# Patient Record
Sex: FEMALE | Race: WHITE | NOT HISPANIC OR LATINO | Employment: OTHER | URBAN - NONMETROPOLITAN AREA
[De-identification: names, ages, dates, MRNs, and addresses within clinical notes are randomized per-mention and may not be internally consistent; named-entity substitution may affect disease eponyms.]

---

## 2018-09-13 ENCOUNTER — OFFICE VISIT (OUTPATIENT)
Dept: UROLOGY | Facility: CLINIC | Age: 48
End: 2018-09-13

## 2018-09-13 VITALS — WEIGHT: 195 LBS | HEIGHT: 65 IN | BODY MASS INDEX: 32.49 KG/M2

## 2018-09-13 DIAGNOSIS — N18.30 STAGE 3 CHRONIC KIDNEY DISEASE (HCC): ICD-10-CM

## 2018-09-13 DIAGNOSIS — N28.1 RENAL CYST: Primary | ICD-10-CM

## 2018-09-13 PROCEDURE — 99203 OFFICE O/P NEW LOW 30 MIN: CPT | Performed by: UROLOGY

## 2018-09-13 RX ORDER — VALACYCLOVIR HYDROCHLORIDE 500 MG/1
TABLET, FILM COATED ORAL DAILY
COMMUNITY
End: 2020-08-17

## 2018-09-13 RX ORDER — ACETAMINOPHEN,DIPHENHYDRAMINE HCL 500; 25 MG/1; MG/1
TABLET, FILM COATED ORAL
COMMUNITY

## 2018-09-13 RX ORDER — TIZANIDINE 4 MG/1
4 TABLET ORAL
COMMUNITY

## 2018-09-13 RX ORDER — LURASIDONE HYDROCHLORIDE 80 MG/1
TABLET, FILM COATED ORAL
COMMUNITY
End: 2019-09-19

## 2018-09-13 RX ORDER — ROSUVASTATIN CALCIUM 20 MG/1
TABLET, COATED ORAL
COMMUNITY

## 2018-09-13 RX ORDER — TOPIRAMATE 50 MG/1
TABLET, FILM COATED ORAL
COMMUNITY

## 2018-09-13 RX ORDER — BUPROPION HYDROCHLORIDE 150 MG/1
150 TABLET ORAL
COMMUNITY

## 2018-09-13 RX ORDER — MIRTAZAPINE 30 MG/1
30 TABLET, FILM COATED ORAL NIGHTLY
COMMUNITY

## 2018-09-13 RX ORDER — CETIRIZINE HYDROCHLORIDE 10 MG/1
TABLET ORAL
COMMUNITY

## 2018-09-13 RX ORDER — AMLODIPINE BESYLATE 10 MG/1
TABLET ORAL DAILY
COMMUNITY
Start: 2018-08-15

## 2018-09-13 NOTE — PROGRESS NOTES
Chief Complaint:          Complex cyst.    HPI:   47 y.o. female.  Pt's creat was 1.46. Pt had a small complex cyst on the left kidney that is 1.8 cm hypoechoic on ultrasound  HPI      Past Medical History:        Past Medical History:   Diagnosis Date   • Bipolar 1 disorder (CMS/HCC)    • Depression    • High cholesterol    • Hypertension          Current Meds:     Current Outpatient Prescriptions   Medication Sig Dispense Refill   • amLODIPine (NORVASC) 10 MG tablet Take  by mouth Daily.     • buPROPion XL (WELLBUTRIN XL) 300 MG 24 hr tablet Take  by mouth.     • Calcium Carb-Cholecalciferol (CALCIUM 600 + D PO) Take  by mouth.     • cetirizine (ZYRTEC ALLERGY) 10 MG tablet Take  by mouth.     • Cholecalciferol (VITAMIN D3) 5000 units capsule capsule Take  by mouth.     • diphenhydrAMINE-acetaminophen (TYLENOL PM EXTRA STRENGTH)  MG tablet per tablet Take  by mouth.     • Lurasidone HCl (LATUDA) 80 MG tablet Take  by mouth.     • mirtazapine (REMERON) 15 MG tablet Take  by mouth.     • Multiple Vitamins-Minerals (WOMENS ONE DAILY PO) Take  by mouth.     • rosuvastatin (CRESTOR) 20 MG tablet Take  by mouth.     • tiZANidine (ZANAFLEX) 4 MG tablet Take  by mouth.     • topiramate (TOPAMAX) 50 MG tablet Take  by mouth.     • valACYclovir (VALTREX) 500 MG tablet Take  by mouth Daily.       No current facility-administered medications for this visit.         Allergies:      No Known Allergies     Past Surgical History:     Past Surgical History:   Procedure Laterality Date   • CHOLECYSTECTOMY     • GASTRIC BYPASS     • HYSTERECTOMY     • TONSILLECTOMY           Social History:     Social History     Social History   • Marital status:      Spouse name: N/A   • Number of children: N/A   • Years of education: N/A     Occupational History   • Not on file.     Social History Main Topics   • Smoking status: Never Smoker   • Smokeless tobacco: Never Used   • Alcohol use No   • Drug use: No   • Sexual activity:  Not on file     Other Topics Concern   • Not on file     Social History Narrative   • No narrative on file       Family History:     Family History   Problem Relation Age of Onset   • Heart disease Father    • Hypertension Father    • Stroke Father    • Diabetes Father    • Hypertension Mother    • Hyperlipidemia Mother        Review of Systems:     Review of Systems   Constitutional: Positive for fatigue. Negative for chills and fever.   Respiratory: Negative for cough, shortness of breath and wheezing.    Cardiovascular: Negative for leg swelling.   Gastrointestinal: Positive for vomiting. Negative for abdominal pain and nausea.   Musculoskeletal: Positive for back pain. Negative for joint swelling.   Neurological: Positive for headaches. Negative for dizziness.   Psychiatric/Behavioral: Negative for confusion.       I have reviewed the follow portions of the patient's history and confirmed they are accurate today:  allergies, current medications, past family history, past medical history, past social history, past surgical history, problem list and ROS  Physical Exam:     Physical Exam   Constitutional: She is oriented to person, place, and time. She appears well-developed.   HENT:   Head: Normocephalic.   Right Ear: External ear normal.   Left Ear: External ear normal.   Nose: Nose normal.   Mouth/Throat: Oropharynx is clear and moist.   Eyes: Pupils are equal, round, and reactive to light. Conjunctivae and EOM are normal.   Neck: Normal range of motion. Neck supple. No tracheal deviation present. No thyromegaly present.   Cardiovascular: Normal heart sounds.  Exam reveals no gallop and no friction rub.    No murmur heard.  Pulmonary/Chest: Effort normal and breath sounds normal. No respiratory distress. She has no wheezes. She has no rales. She exhibits no tenderness.   Abdominal: Soft. Bowel sounds are normal. She exhibits no distension and no mass. There is no tenderness. There is no rebound and no guarding.  "No hernia.   Musculoskeletal: Normal range of motion. She exhibits no edema.   Lymphadenopathy:     She has no cervical adenopathy.   Neurological: She is alert and oriented to person, place, and time. She displays normal reflexes. No cranial nerve deficit. She exhibits normal muscle tone. Coordination normal.   Skin: Skin is warm. No rash noted.   Psychiatric: She has a normal mood and affect. Judgment normal.       Ht 165.1 cm (65\")   Wt 88.5 kg (195 lb)   BMI 32.45 kg/m²    Procedure:         Assessment:     Encounter Diagnoses   Name Primary?   • Renal cyst Yes   • Stage 3 chronic kidney disease      No orders of the defined types were placed in this encounter.      Plan:   Will get a stone study ct scan and follow the left renal lesion with both ct scans without contrast and renal ultrasounds periodically    Patient's Body mass index is 32.45 kg/m². BMI is within normal parameters. No follow-up required.      Counseling was given to patient and family for the following topics instructions for management as follows: renal lesion. The interim medical history and current results were reviewed.  A treatment plan with follow-up was made for Renal cyst [N28.1].This document has been electronically signed by Frandy Vergara MD September 13, 2018 4:13 PM  "

## 2018-10-02 ENCOUNTER — HOSPITAL ENCOUNTER (OUTPATIENT)
Dept: ULTRASOUND IMAGING | Facility: HOSPITAL | Age: 48
Discharge: HOME | End: 2018-10-02
Attending: UROLOGY | Admitting: UROLOGY

## 2018-10-02 DIAGNOSIS — N28.1 RENAL CYST: ICD-10-CM

## 2018-10-02 PROCEDURE — 76775 US EXAM ABDO BACK WALL LIM: CPT

## 2018-10-02 PROCEDURE — 76775 US EXAM ABDO BACK WALL LIM: CPT | Performed by: RADIOLOGY

## 2018-10-23 ENCOUNTER — HOSPITAL ENCOUNTER (OUTPATIENT)
Dept: CT IMAGING | Facility: HOSPITAL | Age: 48
Discharge: HOME | End: 2018-10-23
Attending: UROLOGY | Admitting: UROLOGY

## 2018-10-23 DIAGNOSIS — N28.1 RENAL CYST: ICD-10-CM

## 2018-10-23 PROCEDURE — 74176 CT ABD & PELVIS W/O CONTRAST: CPT

## 2018-10-23 PROCEDURE — 74176 CT ABD & PELVIS W/O CONTRAST: CPT | Performed by: RADIOLOGY

## 2019-03-14 ENCOUNTER — OFFICE VISIT (OUTPATIENT)
Dept: UROLOGY | Facility: CLINIC | Age: 49
End: 2019-03-14

## 2019-03-14 VITALS — WEIGHT: 195 LBS | HEIGHT: 65 IN | BODY MASS INDEX: 32.49 KG/M2

## 2019-03-14 DIAGNOSIS — N28.1 RENAL CYST: Primary | ICD-10-CM

## 2019-03-14 PROCEDURE — 99212 OFFICE O/P EST SF 10 MIN: CPT | Performed by: UROLOGY

## 2019-03-14 NOTE — PROGRESS NOTES
Chief Complaint:          Hx of renal cyst.    HPI:   48 y.o. female.  The ct scan done without contrast did not show any complex cysts.  Patient had a renal ultrasound that suggested a complex cyst 6 months earlier  HPI      Past Medical History:        Past Medical History:   Diagnosis Date   • Bipolar 1 disorder (CMS/HCC)    • Depression    • High cholesterol    • Hypertension          Current Meds:     Current Outpatient Medications   Medication Sig Dispense Refill   • amLODIPine (NORVASC) 10 MG tablet Take  by mouth Daily.     • buPROPion XL (WELLBUTRIN XL) 300 MG 24 hr tablet Take  by mouth.     • Calcium Carb-Cholecalciferol (CALCIUM 600 + D PO) Take  by mouth.     • cetirizine (ZYRTEC ALLERGY) 10 MG tablet Take  by mouth.     • Cholecalciferol (VITAMIN D3) 5000 units capsule capsule Take  by mouth.     • diphenhydrAMINE-acetaminophen (TYLENOL PM EXTRA STRENGTH)  MG tablet per tablet Take  by mouth.     • Lurasidone HCl (LATUDA) 80 MG tablet Take  by mouth.     • mirtazapine (REMERON) 15 MG tablet Take  by mouth.     • Multiple Vitamins-Minerals (WOMENS ONE DAILY PO) Take  by mouth.     • rosuvastatin (CRESTOR) 20 MG tablet Take  by mouth.     • tiZANidine (ZANAFLEX) 4 MG tablet Take  by mouth.     • topiramate (TOPAMAX) 50 MG tablet Take  by mouth.     • valACYclovir (VALTREX) 500 MG tablet Take  by mouth Daily.       No current facility-administered medications for this visit.         Allergies:      No Known Allergies     Past Surgical History:     Past Surgical History:   Procedure Laterality Date   • CHOLECYSTECTOMY     • GASTRIC BYPASS     • HYSTERECTOMY     • TONSILLECTOMY           Social History:     Social History     Socioeconomic History   • Marital status:      Spouse name: Not on file   • Number of children: Not on file   • Years of education: Not on file   • Highest education level: Not on file   Social Needs   • Financial resource strain: Not on file   • Food insecurity - worry:  Not on file   • Food insecurity - inability: Not on file   • Transportation needs - medical: Not on file   • Transportation needs - non-medical: Not on file   Occupational History   • Not on file   Tobacco Use   • Smoking status: Never Smoker   • Smokeless tobacco: Never Used   Substance and Sexual Activity   • Alcohol use: No   • Drug use: No   • Sexual activity: Not on file   Other Topics Concern   • Not on file   Social History Narrative   • Not on file       Family History:     Family History   Problem Relation Age of Onset   • Heart disease Father    • Hypertension Father    • Stroke Father    • Diabetes Father    • Hypertension Mother    • Hyperlipidemia Mother        Review of Systems:     Review of Systems      Physical Exam:     Physical Exam   Constitutional: She is oriented to person, place, and time. She appears well-developed.   HENT:   Head: Normocephalic.   Right Ear: External ear normal.   Left Ear: External ear normal.   Nose: Nose normal.   Mouth/Throat: Oropharynx is clear and moist.   Eyes: Conjunctivae and EOM are normal. Pupils are equal, round, and reactive to light.   Neck: Normal range of motion. Neck supple. No tracheal deviation present. No thyromegaly present.   Cardiovascular: Normal heart sounds. Exam reveals no gallop and no friction rub.   No murmur heard.  Pulmonary/Chest: Effort normal and breath sounds normal. No respiratory distress. She has no wheezes. She has no rales. She exhibits no tenderness.   Abdominal: Soft. Bowel sounds are normal. She exhibits no distension and no mass. There is no tenderness. There is no rebound and no guarding. No hernia.   Musculoskeletal: Normal range of motion. She exhibits no edema.   Lymphadenopathy:     She has no cervical adenopathy.   Neurological: She is alert and oriented to person, place, and time. She displays normal reflexes. No cranial nerve deficit. She exhibits normal muscle tone. Coordination normal.   Skin: Skin is warm. No rash  "noted.   Psychiatric: She has a normal mood and affect. Judgment normal.       Ht 165.1 cm (65\")   Wt 88.5 kg (195 lb)   BMI 32.45 kg/m²    Procedure:         Assessment:     Encounter Diagnosis   Name Primary?   • Renal cyst Yes     No orders of the defined types were placed in this encounter.      Plan:     Renal cysts are important to identify because some cysts have a high association with kidney cancer.  There is a classification system of renal cysts known as Bosniak classification.1 renal cyst is simple like a water balloon thin-walled and has no internal echoes.  Type I Bosniak cyst have been negligible risk of kidney cancer and require reassurance only.  Bosniak 2 cysts have a septated cyst or thickened wall or hyper-density on a CT scan.  These have a low risk of malignancy but more than Bosniak type I and do require follow-up with imaging periodically.  Bosniak 3 cyst have solid and cystic components calcification not confined to the wall of the cyst.  A Bosniak 3 has a 28% chance of renal cancer.  Bosniak 4 has more solid than cystic component and is by definition a kidney cancer.  Large simple renal cysts are occasionally a source of back pain but this requires improving that the cyst is the etiology of the pain by aspiration of the cyst and alleviating the pain and then when the cyst has filled up again the pain is returned.  This does happen but often a cyst t is large and asymptomatic.  The patient likely has a Bosniak 2 complex renal cyst that is small.  We will repeat a renal ultrasound in 6 months to see if we can re-create the imaging findings.  Where is the noncontrast CT scan did not show it  Patient's Body mass index is 32.45 kg/m². BMI is above normal parameters. Recommendations include: educational material.      Counseling was given to patient and family for the following topics diagnostic results including: ct scan findings. The interim medical history and current results were reviewed.  " A treatment plan with follow-up was made for Renal cyst [N28.1].  This document has been electronically signed by Frandy Vergara MD March 14, 2019 2:56 PM

## 2019-04-24 ENCOUNTER — TRANSCRIBE ORDERS (OUTPATIENT)
Dept: ADMINISTRATIVE | Facility: HOSPITAL | Age: 49
End: 2019-04-24

## 2019-04-24 DIAGNOSIS — Z12.31 ENCOUNTER FOR SCREENING MAMMOGRAM FOR MALIGNANT NEOPLASM OF BREAST: Primary | ICD-10-CM

## 2019-06-10 ENCOUNTER — HOSPITAL ENCOUNTER (OUTPATIENT)
Dept: MAMMOGRAPHY | Facility: HOSPITAL | Age: 49
Discharge: HOME | End: 2019-06-10
Admitting: NURSE PRACTITIONER

## 2019-06-10 DIAGNOSIS — Z12.31 ENCOUNTER FOR SCREENING MAMMOGRAM FOR MALIGNANT NEOPLASM OF BREAST: ICD-10-CM

## 2019-06-10 PROCEDURE — 77063 BREAST TOMOSYNTHESIS BI: CPT

## 2019-06-10 PROCEDURE — 77067 SCR MAMMO BI INCL CAD: CPT

## 2019-08-23 ENCOUNTER — HOSPITAL ENCOUNTER (OUTPATIENT)
Dept: ULTRASOUND IMAGING | Facility: HOSPITAL | Age: 49
Discharge: HOME | End: 2019-08-23
Admitting: UROLOGY

## 2019-08-23 DIAGNOSIS — N28.1 RENAL CYST: ICD-10-CM

## 2019-08-23 PROCEDURE — 76775 US EXAM ABDO BACK WALL LIM: CPT

## 2019-08-23 PROCEDURE — 76775 US EXAM ABDO BACK WALL LIM: CPT | Performed by: RADIOLOGY

## 2019-09-19 ENCOUNTER — OFFICE VISIT (OUTPATIENT)
Dept: UROLOGY | Facility: CLINIC | Age: 49
End: 2019-09-19

## 2019-09-19 VITALS — BODY MASS INDEX: 34.66 KG/M2 | WEIGHT: 208 LBS | HEIGHT: 65 IN

## 2019-09-19 DIAGNOSIS — R35.0 FREQUENCY OF MICTURITION: Primary | ICD-10-CM

## 2019-09-19 DIAGNOSIS — N39.0 URINARY TRACT INFECTION WITHOUT HEMATURIA, SITE UNSPECIFIED: ICD-10-CM

## 2019-09-19 LAB
BILIRUB BLD-MCNC: NEGATIVE MG/DL
CLARITY, POC: CLEAR
COLOR UR: YELLOW
GLUCOSE UR STRIP-MCNC: NEGATIVE MG/DL
KETONES UR QL: NEGATIVE
LEUKOCYTE EST, POC: ABNORMAL
NITRITE UR-MCNC: NEGATIVE MG/ML
PH UR: 6 [PH] (ref 5–8)
PROT UR STRIP-MCNC: NEGATIVE MG/DL
RBC # UR STRIP: NEGATIVE /UL
SP GR UR: 1.02 (ref 1–1.03)
UROBILINOGEN UR QL: NORMAL

## 2019-09-19 PROCEDURE — 99213 OFFICE O/P EST LOW 20 MIN: CPT | Performed by: UROLOGY

## 2019-09-19 PROCEDURE — 87077 CULTURE AEROBIC IDENTIFY: CPT | Performed by: UROLOGY

## 2019-09-19 PROCEDURE — 81003 URINALYSIS AUTO W/O SCOPE: CPT | Performed by: UROLOGY

## 2019-09-19 PROCEDURE — 87086 URINE CULTURE/COLONY COUNT: CPT | Performed by: UROLOGY

## 2019-09-19 PROCEDURE — 87186 SC STD MICRODIL/AGAR DIL: CPT | Performed by: UROLOGY

## 2019-09-19 RX ORDER — NITROFURANTOIN 25; 75 MG/1; MG/1
100 CAPSULE ORAL 2 TIMES DAILY
Qty: 14 CAPSULE | Refills: 0 | Status: SHIPPED | OUTPATIENT
Start: 2019-09-19 | End: 2020-08-17

## 2019-09-19 NOTE — PROGRESS NOTES
Chief Complaint:          History of Renal Cyst    HPI:   48 y.o. female. Pt was referred for a complex cyst by her nephrologist that was found by a renal ultrasound.  She had a ct scan with and without contrast and no cyst or mass was seen.  She is here for evaluation of her renal ultrasound to see if the cyst is seen on the same imaging modality as it was initially seen.  Her renal ultrasound is normal    Upon discussing her urinary symptoms it sound like she has a new bladder infection.  Patient also complaints or urinary Frequency and dysuria. Her urinalysis shows some leukocytes. She denies seeing any blood.  She denies fever or chills.      HPI      Past Medical History:        Past Medical History:   Diagnosis Date   • Bipolar 1 disorder (CMS/HCC)    • Depression    • High cholesterol    • Hypertension          Current Meds:     Current Outpatient Medications   Medication Sig Dispense Refill   • amLODIPine (NORVASC) 10 MG tablet Take  by mouth Daily.     • buPROPion XL (WELLBUTRIN XL) 300 MG 24 hr tablet Take 150 mg by mouth.     • Calcium Carb-Cholecalciferol (CALCIUM 600 + D PO) Take  by mouth.     • cetirizine (ZYRTEC ALLERGY) 10 MG tablet Take  by mouth.     • Cholecalciferol (VITAMIN D3) 5000 units capsule capsule Take  by mouth.     • diphenhydrAMINE-acetaminophen (TYLENOL PM EXTRA STRENGTH)  MG tablet per tablet Take  by mouth.     • mirtazapine (REMERON) 15 MG tablet Take  by mouth.     • Multiple Vitamins-Minerals (WOMENS ONE DAILY PO) Take  by mouth.     • rosuvastatin (CRESTOR) 20 MG tablet Take  by mouth.     • tiZANidine (ZANAFLEX) 4 MG tablet Take  by mouth.     • topiramate (TOPAMAX) 50 MG tablet Take  by mouth.     • valACYclovir (VALTREX) 500 MG tablet Take  by mouth Daily.       No current facility-administered medications for this visit.         Allergies:      No Known Allergies     Past Surgical History:     Past Surgical History:   Procedure Laterality Date   • CHOLECYSTECTOMY      • GASTRIC BYPASS     • HYSTERECTOMY     • TONSILLECTOMY           Social History:     Social History     Socioeconomic History   • Marital status:      Spouse name: Not on file   • Number of children: Not on file   • Years of education: Not on file   • Highest education level: Not on file   Tobacco Use   • Smoking status: Never Smoker   • Smokeless tobacco: Never Used   Substance and Sexual Activity   • Alcohol use: No   • Drug use: No       Family History:     Family History   Problem Relation Age of Onset   • Heart disease Father    • Hypertension Father    • Stroke Father    • Diabetes Father    • Hypertension Mother    • Hyperlipidemia Mother        Review of Systems:     Review of Systems   Constitutional: Negative for fatigue.   HENT: Positive for sinus pressure and sinus pain.    Eyes: Negative for pain and redness.   Respiratory: Negative for chest tightness.    Cardiovascular: Negative for chest pain.   Gastrointestinal: Negative for abdominal pain, constipation and diarrhea.   Endocrine: Negative for heat intolerance.   Genitourinary: Positive for frequency. Negative for dysuria and hematuria.   Musculoskeletal: Positive for back pain.   Skin: Negative for rash.   Allergic/Immunologic: Positive for food allergies.   Neurological: Positive for headaches.   Hematological: Does not bruise/bleed easily.   Psychiatric/Behavioral: The patient is nervous/anxious.                Physical Exam:     Physical Exam   Constitutional: She is oriented to person, place, and time. She appears well-developed.   HENT:   Head: Normocephalic.   Right Ear: External ear normal.   Left Ear: External ear normal.   Nose: Nose normal.   Mouth/Throat: Oropharynx is clear and moist.   Eyes: Conjunctivae and EOM are normal. Pupils are equal, round, and reactive to light.   Neck: Normal range of motion. Neck supple. No tracheal deviation present. No thyromegaly present.   Cardiovascular: Normal heart sounds. Exam reveals no  "gallop and no friction rub.   No murmur heard.  Pulmonary/Chest: Effort normal and breath sounds normal. No respiratory distress. She has no wheezes. She has no rales. She exhibits no tenderness.   Abdominal: Soft. Bowel sounds are normal. She exhibits no distension and no mass. There is no tenderness. There is no rebound and no guarding. No hernia.   Musculoskeletal: Normal range of motion. She exhibits no edema.   Lymphadenopathy:     She has no cervical adenopathy.   Neurological: She is alert and oriented to person, place, and time. She displays normal reflexes. No cranial nerve deficit. She exhibits normal muscle tone. Coordination normal.   Skin: Skin is warm. No rash noted.   Psychiatric: She has a normal mood and affect. Judgment normal.       Ht 165.1 cm (65\")   Wt 94.3 kg (208 lb)   BMI 34.61 kg/m²    Procedure:         Assessment:     Encounter Diagnoses   Name Primary?   • Frequency of micturition Yes   • Urinary tract infection without hematuria, site unspecified        Orders Placed This Encounter   Procedures   • Urine Culture - Urine, Urine, Random Void   • POC Urinalysis Dipstick, Automated       Patient reports that she is not currently experiencing any symptoms of urinary incontinence.      Plan:     Will do a urine culture. Will start patient on Macrobid 100 mg BID x 7 days. Will see her back in three months unless her symptoms do not resolve for which she has been educated to call for an earlier appointment.    Patient's Body mass index is 34.61 kg/m². BMI is above normal parameters. Recommendations include: educational material.      Smoking Cessation Counseling:  Never a smoker.  Patient does not currently use any tobacco products.       Counseling was given to patient for the following topics prognosis and treatment as follows: antibiotics, increasing fluid intake and hygiene, daiabetic management. The interim medical history and current results were reviewed.  A treatment plan with " follow-up was made for Frequency of micturition [R35.0].         This document has been electronically signed by Frandy Vergara MD September 19, 2019 1:02 PM

## 2019-09-21 LAB — BACTERIA SPEC AEROBE CULT: ABNORMAL

## 2019-09-23 ENCOUNTER — TELEPHONE (OUTPATIENT)
Dept: UROLOGY | Facility: CLINIC | Age: 49
End: 2019-09-23

## 2019-09-23 DIAGNOSIS — N39.0 URINARY TRACT INFECTION WITHOUT HEMATURIA, SITE UNSPECIFIED: Primary | ICD-10-CM

## 2019-09-23 RX ORDER — SULFAMETHOXAZOLE AND TRIMETHOPRIM 800; 160 MG/1; MG/1
1 TABLET ORAL 2 TIMES DAILY
Qty: 20 TABLET | Refills: 0 | Status: SHIPPED | OUTPATIENT
Start: 2019-09-23 | End: 2020-08-17

## 2019-09-23 NOTE — TELEPHONE ENCOUNTER
----- Message from Frandy Vergara MD sent at 9/23/2019  9:25 AM EDT -----  Call pt and tell her the organism is resistant to macrodantin.  Tell her the name of the organism.  If she is not allergic to sulfa have her take septra DS bid dist 20 and if she is allergic then cipro 250 mg bid dist 20  ----- Message -----  From: Lenny Avilez MA  Sent: 9/19/2019   1:02 PM  To: Frandy Vergara MD

## 2020-08-17 ENCOUNTER — OFFICE VISIT (OUTPATIENT)
Dept: UROLOGY | Facility: CLINIC | Age: 50
End: 2020-08-17

## 2020-08-17 VITALS — WEIGHT: 211 LBS | BODY MASS INDEX: 35.16 KG/M2 | HEIGHT: 65 IN | TEMPERATURE: 98.6 F

## 2020-08-17 DIAGNOSIS — N28.1 RENAL CYST: Primary | ICD-10-CM

## 2020-08-17 PROCEDURE — 99213 OFFICE O/P EST LOW 20 MIN: CPT | Performed by: UROLOGY

## 2020-08-17 RX ORDER — LURASIDONE HYDROCHLORIDE 80 MG/1
1 TABLET, FILM COATED ORAL DAILY
COMMUNITY

## 2020-08-17 RX ORDER — CLONIDINE HYDROCHLORIDE 0.1 MG/1
0.1 TABLET ORAL 2 TIMES DAILY
COMMUNITY

## 2020-08-17 RX ORDER — ASPIRIN 81 MG/1
81 TABLET ORAL DAILY
COMMUNITY

## 2020-08-17 NOTE — PROGRESS NOTES
Chief Complaint:          Chief Complaint   Patient presents with   • Renal Cyst     1 yr f/u       HPI:   49 y.o. female is referred for second opinion apparently saw Dr. Vergara in 2018, 2019 1 was a CT scan one was an ultrasound neither showed a cyst apparently she is been diagnosed with a small 1.8 cm cyst and she previously weighed 350 pounds she now weighs 211 after very successful sling.  I reviewed the films.  I did not have an x-ray that in the system demonstrating a cyst I had a note from [facility] with a disc with no drivers therefore was unable to be reviewed showing a small left kidney.  With normal echogenicity and a very small perhaps Bosniak 2 cyst her urinalysis was 2+ positive for leukocyte esterase her creatinine is slightly high at 1.15 indicating stage II chronic kidney disease I discussed the diagnosis of cystic disease at length I will go ahead and order a MRI as confirmation      Past Medical History:        Past Medical History:   Diagnosis Date   • Bipolar 1 disorder (CMS/HCC)    • Depression    • High cholesterol    • Hypertension          Current Meds:     Current Outpatient Medications   Medication Sig Dispense Refill   • amLODIPine (NORVASC) 10 MG tablet Take  by mouth Daily.     • aspirin 81 MG EC tablet Take 81 mg by mouth Daily.     • buPROPion XL (WELLBUTRIN XL) 150 MG 24 hr tablet Take 150 mg by mouth.     • CALCIUM PO Take  by mouth 3 (Three) Times a Day.     • cetirizine (ZYRTEC ALLERGY) 10 MG tablet Take  by mouth.     • cloNIDine (CATAPRES) 0.1 MG tablet Take 0.1 mg by mouth 2 (Two) Times a Day.     • diphenhydrAMINE-acetaminophen (TYLENOL PM EXTRA STRENGTH)  MG tablet per tablet Take  by mouth.     • Lurasidone HCl (Latuda) 80 MG tablet Take 1 tablet by mouth Daily.     • mirtazapine (REMERON) 30 MG tablet Take 30 mg by mouth Every Night.     • Multiple Vitamins-Minerals (WOMENS ONE DAILY PO) Take  by mouth.     • rosuvastatin (CRESTOR) 20 MG tablet Take  by  mouth.     • tiZANidine (ZANAFLEX) 4 MG tablet Take 4 mg by mouth.     • topiramate (TOPAMAX) 50 MG tablet Take  by mouth.       No current facility-administered medications for this visit.         Allergies:      Allergies   Allergen Reactions   • Egg Yolk Nausea Only        Past Surgical History:     Past Surgical History:   Procedure Laterality Date   • CHOLECYSTECTOMY     • GASTRIC BYPASS     • HYSTERECTOMY     • TONSILLECTOMY           Social History:     Social History     Socioeconomic History   • Marital status:      Spouse name: Not on file   • Number of children: Not on file   • Years of education: Not on file   • Highest education level: Not on file   Tobacco Use   • Smoking status: Never Smoker   • Smokeless tobacco: Never Used   Substance and Sexual Activity   • Alcohol use: No   • Drug use: No       Family History:     Family History   Problem Relation Age of Onset   • Heart disease Father    • Hypertension Father    • Stroke Father    • Diabetes Father    • Hypertension Mother    • Hyperlipidemia Mother        Review of Systems:     Review of Systems   Constitutional: Negative.  Negative for activity change, appetite change, chills, diaphoresis, fatigue and unexpected weight change.   HENT: Negative for congestion, dental problem, drooling, ear discharge, ear pain, facial swelling, hearing loss, mouth sores, nosebleeds, postnasal drip, rhinorrhea, sinus pressure, sneezing, sore throat, tinnitus, trouble swallowing and voice change.    Eyes: Negative.  Negative for photophobia, pain, discharge, redness, itching and visual disturbance.   Respiratory: Negative.  Negative for apnea, cough, choking, chest tightness, shortness of breath, wheezing and stridor.    Cardiovascular: Negative.  Negative for chest pain, palpitations and leg swelling.   Gastrointestinal: Negative.  Negative for abdominal distention, abdominal pain, anal bleeding, blood in stool, constipation, diarrhea, nausea, rectal pain  and vomiting.   Endocrine: Negative.  Negative for cold intolerance, heat intolerance, polydipsia, polyphagia and polyuria.   Musculoskeletal: Negative.  Negative for arthralgias, back pain, gait problem, joint swelling, myalgias, neck pain and neck stiffness.   Skin: Negative.  Negative for color change, pallor, rash and wound.   Allergic/Immunologic: Negative.  Negative for environmental allergies, food allergies and immunocompromised state.   Neurological: Negative.  Negative for dizziness, tremors, seizures, syncope, facial asymmetry, speech difficulty, weakness, light-headedness, numbness and headaches.   Hematological: Negative.  Negative for adenopathy. Does not bruise/bleed easily.   Psychiatric/Behavioral: Negative for agitation, behavioral problems, confusion, decreased concentration, dysphoric mood, hallucinations, self-injury, sleep disturbance and suicidal ideas. The patient is not nervous/anxious and is not hyperactive.    All other systems reviewed and are negative.      Physical Exam:     Physical Exam   Constitutional: She appears well-developed and well-nourished.   HENT:   Head: Normocephalic and atraumatic.   Right Ear: External ear normal.   Left Ear: External ear normal.   Mouth/Throat: Oropharynx is clear and moist.   Eyes: Pupils are equal, round, and reactive to light. Conjunctivae are normal.   Cardiovascular: Normal rate, regular rhythm, normal heart sounds and intact distal pulses.   Pulmonary/Chest: Effort normal and breath sounds normal.   Abdominal: Soft. Bowel sounds are normal. She exhibits no distension and no mass. There is no tenderness. There is no rebound and no guarding.   Genitourinary: No vaginal discharge found.   Musculoskeletal: Normal range of motion.   Neurological: She is alert. She has normal reflexes.   Skin: Skin is warm and dry.   Psychiatric: She has a normal mood and affect. Her behavior is normal. Judgment and thought content normal.       I have reviewed the  following portions of the patient's history: allergies, current medications, past family history, past medical history, past social history, past surgical history, problem list and ROS and confirm it's accurate.      Procedure:       Assessment/Plan:   Renal cyst-I discussed the Bosniak classification of renal cysts.  I discussed the strict criteria involved with making a decision regarding intervention.  We discussed the fact that this is likely a Bosniak type I cyst which has sharp distinct borders and a thin wall and no internal echoes versus a Bosniak 2 with a thicker wall and faint striations.  We discussed the risks of malignancy in these situations is essentially 0 and requires no further intervention however we discussed the fact that a Bosniak 3 has about a 28% chance of malignancy and finally a Bosniak for probably is representative of a renal cell carcinoma variant.  He discussed the fact that these are generally asymptomatic and do not require intervention and that the appropriate surgical management is a radiographic cyst puncture when causing pain or problems particularly on the left with an early satiety get MRI scan            Patient's Body mass index is 35.11 kg/m². BMI is above normal parameters. Recommendations include: educational material.              This document has been electronically signed by SALOMON CARBALLO MD August 17, 2020 14:14

## 2020-08-18 PROBLEM — N28.1 RENAL CYST: Status: ACTIVE | Noted: 2020-08-18

## 2020-08-24 ENCOUNTER — HOSPITAL ENCOUNTER (OUTPATIENT)
Dept: MRI IMAGING | Facility: HOSPITAL | Age: 50
Discharge: HOME | End: 2020-08-24
Admitting: UROLOGY

## 2020-08-24 DIAGNOSIS — N28.1 RENAL CYST: ICD-10-CM

## 2020-08-24 PROCEDURE — 74181 MRI ABDOMEN W/O CONTRAST: CPT | Performed by: RADIOLOGY

## 2020-08-24 PROCEDURE — 74181 MRI ABDOMEN W/O CONTRAST: CPT

## 2020-09-01 ENCOUNTER — OFFICE VISIT (OUTPATIENT)
Dept: UROLOGY | Facility: CLINIC | Age: 50
End: 2020-09-01

## 2020-09-01 VITALS — TEMPERATURE: 98.5 F | WEIGHT: 210.98 LBS | BODY MASS INDEX: 35.15 KG/M2 | HEIGHT: 65 IN

## 2020-09-01 DIAGNOSIS — N30.00 ACUTE CYSTITIS WITHOUT HEMATURIA: Primary | ICD-10-CM

## 2020-09-01 DIAGNOSIS — N32.81 DETRUSOR INSTABILITY: ICD-10-CM

## 2020-09-01 DIAGNOSIS — N28.1 RENAL CYST: ICD-10-CM

## 2020-09-01 PROCEDURE — 99214 OFFICE O/P EST MOD 30 MIN: CPT | Performed by: UROLOGY

## 2020-09-01 RX ORDER — CIPROFLOXACIN 500 MG/1
250 TABLET, FILM COATED ORAL 2 TIMES DAILY
Qty: 20 TABLET | Refills: 0 | Status: SHIPPED | OUTPATIENT
Start: 2020-09-01

## 2020-09-01 NOTE — PROGRESS NOTES
Chief Complaint:          Chief Complaint   Patient presents with   • RENAL CYST     2 WEEK F/U       HPI:   49 y.o. female was referred for second opinion apparently saw Dr. Vergara in 2018, 2019 1 was a CT scan one was an ultrasound neither showed a cyst apparently she is been diagnosed with a small 1.8 cm cyst and she previously weighed 350 pounds she now weighs 211 after very successful sling.  I reviewed the films.  I did not have an x-ray that in the system demonstrating a cyst I had a note from [facility] with a disc with no drivers therefore was unable to be reviewed showing a small left kidney.  With normal echogenicity and a very small perhaps Bosniak 2 cyst her urinalysis was 2+ positive for leukocyte esterase her creatinine is slightly high at 1.15 indicating stage II chronic kidney disease I discussed the diagnosis of cystic disease at length I will go ahead and order a MRI as confirmation.  She returns today I reviewed the MRI with her from done on August 20 4000 2020 showing simple cyst this is Bosniak 1 simple renal cystic disease of no clinical significance and therefore I recommend only observation she has significant dysuria she still gets up 4-5 times per night.  She I am to start her on prophylactic Macrobid.  For recurrent urinary tract infections we discussed probiotics I will see her back in 6 months      Past Medical History:        Past Medical History:   Diagnosis Date   • Bipolar 1 disorder (CMS/HCC)    • Depression    • High cholesterol    • Hypertension          Current Meds:     Current Outpatient Medications   Medication Sig Dispense Refill   • amLODIPine (NORVASC) 10 MG tablet Take  by mouth Daily.     • aspirin 81 MG EC tablet Take 81 mg by mouth Daily.     • buPROPion XL (WELLBUTRIN XL) 150 MG 24 hr tablet Take 150 mg by mouth.     • CALCIUM PO Take  by mouth 3 (Three) Times a Day.     • cetirizine (ZYRTEC ALLERGY) 10 MG tablet Take  by mouth.     • cloNIDine (CATAPRES) 0.1  MG tablet Take 0.1 mg by mouth 2 (Two) Times a Day.     • diphenhydrAMINE-acetaminophen (TYLENOL PM EXTRA STRENGTH)  MG tablet per tablet Take  by mouth.     • Lurasidone HCl (Latuda) 80 MG tablet Take 1 tablet by mouth Daily.     • mirtazapine (REMERON) 30 MG tablet Take 30 mg by mouth Every Night.     • Multiple Vitamins-Minerals (WOMENS ONE DAILY PO) Take  by mouth.     • rosuvastatin (CRESTOR) 20 MG tablet Take  by mouth.     • tiZANidine (ZANAFLEX) 4 MG tablet Take 4 mg by mouth.     • topiramate (TOPAMAX) 50 MG tablet Take  by mouth.       No current facility-administered medications for this visit.         Allergies:      Allergies   Allergen Reactions   • Egg Yolk Nausea Only        Past Surgical History:     Past Surgical History:   Procedure Laterality Date   • CHOLECYSTECTOMY     • GASTRIC BYPASS     • HYSTERECTOMY     • TONSILLECTOMY           Social History:     Social History     Socioeconomic History   • Marital status:      Spouse name: Not on file   • Number of children: Not on file   • Years of education: Not on file   • Highest education level: Not on file   Tobacco Use   • Smoking status: Never Smoker   • Smokeless tobacco: Never Used   Substance and Sexual Activity   • Alcohol use: No   • Drug use: No       Family History:     Family History   Problem Relation Age of Onset   • Heart disease Father    • Hypertension Father    • Stroke Father    • Diabetes Father    • Hypertension Mother    • Hyperlipidemia Mother        Review of Systems:     Review of Systems   Constitutional: Negative.  Negative for activity change, appetite change, chills, diaphoresis, fatigue and unexpected weight change.   HENT: Negative for congestion, dental problem, drooling, ear discharge, ear pain, facial swelling, hearing loss, mouth sores, nosebleeds, postnasal drip, rhinorrhea, sinus pressure, sneezing, sore throat, tinnitus, trouble swallowing and voice change.    Eyes: Negative.  Negative for  photophobia, pain, discharge, redness, itching and visual disturbance.   Respiratory: Negative.  Negative for apnea, cough, choking, chest tightness, shortness of breath, wheezing and stridor.    Cardiovascular: Negative.  Negative for chest pain, palpitations and leg swelling.   Gastrointestinal: Negative.  Negative for abdominal distention, abdominal pain, anal bleeding, blood in stool, constipation, diarrhea, nausea, rectal pain and vomiting.   Endocrine: Negative.  Negative for cold intolerance, heat intolerance, polydipsia, polyphagia and polyuria.   Genitourinary: Positive for frequency and urgency.   Musculoskeletal: Negative.  Negative for arthralgias, back pain, gait problem, joint swelling, myalgias, neck pain and neck stiffness.   Skin: Negative.  Negative for color change, pallor, rash and wound.   Allergic/Immunologic: Negative.  Negative for environmental allergies, food allergies and immunocompromised state.   Neurological: Negative.  Negative for dizziness, tremors, seizures, syncope, facial asymmetry, speech difficulty, weakness, light-headedness, numbness and headaches.   Hematological: Negative.  Negative for adenopathy. Does not bruise/bleed easily.   Psychiatric/Behavioral: Negative for agitation, behavioral problems, confusion, decreased concentration, dysphoric mood, hallucinations, self-injury, sleep disturbance and suicidal ideas. The patient is not nervous/anxious and is not hyperactive.    All other systems reviewed and are negative.      Physical Exam:     Physical Exam   Constitutional: She appears well-developed and well-nourished.   HENT:   Head: Normocephalic and atraumatic.   Right Ear: External ear normal.   Left Ear: External ear normal.   Mouth/Throat: Oropharynx is clear and moist.   Eyes: Pupils are equal, round, and reactive to light. Conjunctivae are normal.   Cardiovascular: Normal rate, regular rhythm, normal heart sounds and intact distal pulses.   Pulmonary/Chest: Effort  normal and breath sounds normal.   Abdominal: Soft. Bowel sounds are normal. She exhibits no distension and no mass. There is no tenderness. There is no rebound and no guarding.   Genitourinary: No vaginal discharge found.   Musculoskeletal: Normal range of motion.   Neurological: She is alert. She has normal reflexes.   Skin: Skin is warm and dry.   Psychiatric: She has a normal mood and affect. Her behavior is normal. Judgment and thought content normal.       I have reviewed the following portions of the patient's history: allergies, current medications, past family history, past medical history, past social history, past surgical history, problem list and ROS and confirm it's accurate.      Procedure:       Assessment/Plan:   Renal cyst-I discussed the Bosniak classification of renal cysts.  I discussed the strict criteria involved with making a decision regarding intervention.  We discussed the fact that this is likely a Bosniak type I cyst which has sharp distinct borders and a thin wall and no internal echoes versus a Bosniak 2 with a thicker wall and faint striations.  We discussed the risks of malignancy in these situations is essentially 0 and requires no further intervention however we discussed the fact that a Bosniak 3 has about a 28% chance of malignancy and finally a Bosniak for probably is representative of a renal cell carcinoma variant.  He discussed the fact that these are generally asymptomatic and do not require intervention and that the appropriate surgical management is a radiographic cyst puncture when causing pain or problems particularly on the left with an early satiety.  Renal MRI was confirmatory for benign simple cystic disease Bosniak 1.  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  The mainstay of treatment has been tight cholinergics which basically caused the bladder to have decreased contractility.  We have discussed the side effects of these treatments including dry mouth, double vision, and increasing constipation.  Recurrent urinary tract infections-patient has been referred and diagnosed with recurrent urinary tract infections.  We discussed the types of organisms that are found in the urinary tract indicating that the vast majority are results of the patient's own gastrointestinal felicitas.  We discussed how many of the antibiotics that are utilized can actually exacerbate these infections by creating resistant organisms and there is only a very few antibiotics that are concentrated in the urine and do not affect the rectal reservoir nor cause recurrent yeast vaginitis.  We discussed the risk factors for recurrent infections being intercourse in younger patients and atrophic changes in older patients.  We discussed the symptoms that are found including pain, pressure, burning, frequency, urgency suprapubic pain and painful intercourse.  I discussed upper tract symptoms including fevers, chills, and indicated the workup would be much more aggressive if the patient were to present with recurrent infections in the face of upper tract symptomatology such as fever.  I discussed the history of vesicoureteral reflux in young patients and finally chronic renal scarring as a result of such.  I recommend concomitant probiotics with treatment with antibiotics to protect the rectal reservoir including over-the-counter yogurt preparations to annmarie oral pills containing the appropriate probiotics.  Start her on prophylactic Macrobid          Patient's Body mass index is 35.11 kg/m². BMI is above normal parameters. Recommendations include: educational material.              This document has been electronically signed by SALOMON CARBALLO MD September 1, 2020 09:05

## 2020-09-02 PROBLEM — N32.81 DETRUSOR INSTABILITY: Status: ACTIVE | Noted: 2020-09-02

## 2020-09-02 PROBLEM — N30.00 ACUTE CYSTITIS WITHOUT HEMATURIA: Status: ACTIVE | Noted: 2020-09-02

## 2021-03-23 ENCOUNTER — BULK ORDERING (OUTPATIENT)
Dept: CASE MANAGEMENT | Facility: OTHER | Age: 51
End: 2021-03-23

## 2021-03-23 ENCOUNTER — OFFICE VISIT (OUTPATIENT)
Dept: UROLOGY | Facility: CLINIC | Age: 51
End: 2021-03-23

## 2021-03-23 VITALS — BODY MASS INDEX: 35.15 KG/M2 | TEMPERATURE: 98.5 F | WEIGHT: 210.98 LBS | HEIGHT: 65 IN

## 2021-03-23 DIAGNOSIS — N30.00 ACUTE CYSTITIS WITHOUT HEMATURIA: ICD-10-CM

## 2021-03-23 DIAGNOSIS — N39.0 URINARY TRACT INFECTION WITHOUT HEMATURIA, SITE UNSPECIFIED: ICD-10-CM

## 2021-03-23 DIAGNOSIS — R35.0 FREQUENCY OF MICTURITION: Primary | ICD-10-CM

## 2021-03-23 DIAGNOSIS — N28.1 RENAL CYST: ICD-10-CM

## 2021-03-23 DIAGNOSIS — Z23 IMMUNIZATION DUE: ICD-10-CM

## 2021-03-23 LAB
BILIRUB BLD-MCNC: ABNORMAL MG/DL
CLARITY, POC: ABNORMAL
COLOR UR: YELLOW
GLUCOSE UR STRIP-MCNC: NEGATIVE MG/DL
KETONES UR QL: NEGATIVE
LEUKOCYTE EST, POC: ABNORMAL
NITRITE UR-MCNC: NEGATIVE MG/ML
PH UR: 6 [PH] (ref 5–8)
PROT UR STRIP-MCNC: NEGATIVE MG/DL
RBC # UR STRIP: NEGATIVE /UL
SP GR UR: 1.02 (ref 1–1.03)
UROBILINOGEN UR QL: NORMAL

## 2021-03-23 PROCEDURE — 99213 OFFICE O/P EST LOW 20 MIN: CPT | Performed by: UROLOGY

## 2021-03-23 PROCEDURE — 96372 THER/PROPH/DIAG INJ SC/IM: CPT | Performed by: UROLOGY

## 2021-03-23 PROCEDURE — 87086 URINE CULTURE/COLONY COUNT: CPT | Performed by: UROLOGY

## 2021-03-23 PROCEDURE — 81003 URINALYSIS AUTO W/O SCOPE: CPT | Performed by: UROLOGY

## 2021-03-23 RX ORDER — NITROFURANTOIN 25; 75 MG/1; MG/1
100 CAPSULE ORAL DAILY
Qty: 56 CAPSULE | Refills: 3 | Status: SHIPPED | OUTPATIENT
Start: 2021-03-23

## 2021-03-23 RX ORDER — GENTAMICIN SULFATE 40 MG/ML
80 INJECTION, SOLUTION INTRAMUSCULAR; INTRAVENOUS ONCE
Status: COMPLETED | OUTPATIENT
Start: 2021-03-23 | End: 2021-03-23

## 2021-03-23 RX ADMIN — GENTAMICIN SULFATE 80 MG: 40 INJECTION, SOLUTION INTRAMUSCULAR; INTRAVENOUS at 15:26

## 2021-03-23 NOTE — PROGRESS NOTES
Chief Complaint:          Chief Complaint   Patient presents with   • RENAL CYST       HPI:   50 y.o. female returns today for 6-month follow-up of the cyst her urine was positive she gets up 3-4 times per night.  She has no fevers chills no vomiting no nausea.  No fevers no nothing that is causing significant problems and we will start her empirically on an antibiotic and follow-up with her based on this the results of the culture      Past Medical History:        Past Medical History:   Diagnosis Date   • Bipolar 1 disorder (CMS/HCC)    • Depression    • High cholesterol    • Hypertension          Current Meds:     Current Outpatient Medications   Medication Sig Dispense Refill   • amLODIPine (NORVASC) 10 MG tablet Take  by mouth Daily.     • aspirin 81 MG EC tablet Take 81 mg by mouth Daily.     • buPROPion XL (WELLBUTRIN XL) 150 MG 24 hr tablet Take 150 mg by mouth.     • CALCIUM PO Take  by mouth 3 (Three) Times a Day.     • cetirizine (ZYRTEC ALLERGY) 10 MG tablet Take  by mouth.     • ciprofloxacin (Cipro) 500 MG tablet Take 0.5 tablets by mouth 2 (Two) Times a Day. 20 tablet 0   • cloNIDine (CATAPRES) 0.1 MG tablet Take 0.1 mg by mouth 2 (Two) Times a Day.     • diphenhydrAMINE-acetaminophen (TYLENOL PM EXTRA STRENGTH)  MG tablet per tablet Take  by mouth.     • Lurasidone HCl (Latuda) 80 MG tablet Take 1 tablet by mouth Daily.     • mirtazapine (REMERON) 30 MG tablet Take 30 mg by mouth Every Night.     • Multiple Vitamins-Minerals (WOMENS ONE DAILY PO) Take  by mouth.     • rosuvastatin (CRESTOR) 20 MG tablet Take  by mouth.     • tiZANidine (ZANAFLEX) 4 MG tablet Take 4 mg by mouth.     • topiramate (TOPAMAX) 50 MG tablet Take  by mouth.       No current facility-administered medications for this visit.        Allergies:      Allergies   Allergen Reactions   • Egg Yolk Nausea Only        Past Surgical History:     Past Surgical History:   Procedure Laterality Date   • CHOLECYSTECTOMY     • GASTRIC  BYPASS     • HYSTERECTOMY     • TONSILLECTOMY           Social History:     Social History     Socioeconomic History   • Marital status:      Spouse name: Not on file   • Number of children: Not on file   • Years of education: Not on file   • Highest education level: Not on file   Tobacco Use   • Smoking status: Never Smoker   • Smokeless tobacco: Never Used   Substance and Sexual Activity   • Alcohol use: No   • Drug use: No       Family History:     Family History   Problem Relation Age of Onset   • Heart disease Father    • Hypertension Father    • Stroke Father    • Diabetes Father    • Hypertension Mother    • Hyperlipidemia Mother        Review of Systems:     Review of Systems   Constitutional: Negative.  Negative for activity change, appetite change, chills, diaphoresis, fatigue and unexpected weight change.   HENT: Negative for congestion, dental problem, drooling, ear discharge, ear pain, facial swelling, hearing loss, mouth sores, nosebleeds, postnasal drip, rhinorrhea, sinus pressure, sneezing, sore throat, tinnitus, trouble swallowing and voice change.    Eyes: Negative.  Negative for photophobia, pain, discharge, redness, itching and visual disturbance.   Respiratory: Negative.  Negative for apnea, cough, choking, chest tightness, shortness of breath, wheezing and stridor.    Cardiovascular: Negative.  Negative for chest pain, palpitations and leg swelling.   Gastrointestinal: Negative.  Negative for abdominal distention, abdominal pain, anal bleeding, blood in stool, constipation, diarrhea, nausea, rectal pain and vomiting.   Endocrine: Negative.  Negative for cold intolerance, heat intolerance, polydipsia, polyphagia and polyuria.   Musculoskeletal: Negative.  Negative for arthralgias, back pain, gait problem, joint swelling, myalgias, neck pain and neck stiffness.   Skin: Negative.  Negative for color change, pallor, rash and wound.   Allergic/Immunologic: Negative.  Negative for  environmental allergies, food allergies and immunocompromised state.   Neurological: Negative.  Negative for dizziness, tremors, seizures, syncope, facial asymmetry, speech difficulty, weakness, light-headedness, numbness and headaches.   Hematological: Negative.  Negative for adenopathy. Does not bruise/bleed easily.   Psychiatric/Behavioral: Negative for agitation, behavioral problems, confusion, decreased concentration, dysphoric mood, hallucinations, self-injury, sleep disturbance and suicidal ideas. The patient is not nervous/anxious and is not hyperactive.    All other systems reviewed and are negative.      Physical Exam:     Physical Exam  Constitutional:       Appearance: She is well-developed.   HENT:      Head: Normocephalic and atraumatic.      Right Ear: External ear normal.      Left Ear: External ear normal.   Eyes:      Conjunctiva/sclera: Conjunctivae normal.      Pupils: Pupils are equal, round, and reactive to light.   Cardiovascular:      Rate and Rhythm: Normal rate and regular rhythm.      Heart sounds: Normal heart sounds.   Pulmonary:      Effort: Pulmonary effort is normal.      Breath sounds: Normal breath sounds.   Abdominal:      General: Bowel sounds are normal. There is no distension.      Palpations: Abdomen is soft. There is no mass.      Tenderness: There is no abdominal tenderness. There is no guarding or rebound.   Genitourinary:     Vagina: No vaginal discharge.   Musculoskeletal:         General: Normal range of motion.   Skin:     General: Skin is warm and dry.   Neurological:      Mental Status: She is alert.      Deep Tendon Reflexes: Reflexes are normal and symmetric.   Psychiatric:         Behavior: Behavior normal.         Thought Content: Thought content normal.         Judgment: Judgment normal.         I have reviewed the following portions of the patient's history: allergies, current medications, past family history, past medical history, past social history, past  surgical history, problem list and ROS and confirm it's accurate.      Procedure:       Assessment/Plan:   Renal cyst-I discussed the Bosniak classification of renal cysts.  I discussed the strict criteria involved with making a decision regarding intervention.  We discussed the fact that this is likely a Bosniak type I cyst which has sharp distinct borders and a thin wall and no internal echoes versus a Bosniak 2 with a thicker wall and faint striations.  We discussed the risks of malignancy in these situations is essentially 0 and requires no further intervention however we discussed the fact that a Bosniak 3 has about a 28% chance of malignancy and finally a Bosniak for probably is representative of a renal cell carcinoma variant.  He discussed the fact that these are generally asymptomatic and do not require intervention and that the appropriate surgical management is a radiographic cyst puncture when causing pain or problems particularly on the left with an early satiety  Recurrent urinary tract infections-patient has been referred and diagnosed with recurrent urinary tract infections.  We discussed the types of organisms that are found in the urinary tract indicating that the vast majority are results of the patient's own gastrointestinal felicitas.  We discussed how many of the antibiotics that are utilized can actually exacerbate these infections by creating resistant organisms and there is only a very few antibiotics that are concentrated in the urine and do not affect the rectal reservoir nor cause recurrent yeast vaginitis.  We discussed the risk factors for recurrent infections being intercourse in younger patients and atrophic changes in older patients.  We discussed the symptoms that are found including pain, pressure, burning, frequency, urgency suprapubic pain and painful intercourse.  I discussed upper tract symptoms including fevers, chills, and indicated the workup would be much more aggressive if the  patient were to present with recurrent infections in the face of upper tract symptomatology such as fever.  I discussed the history of vesicoureteral reflux in young patients and finally chronic renal scarring as a result of such.  I recommend concomitant probiotics with treatment with antibiotics to protect the rectal reservoir including over-the-counter yogurt preparations to annmarie oral pills containing the appropriate probiotics.  She has an obvious urinary tract infection will culture and treat her empirically pending the results of her labs          Patient's Body mass index is 35.11 kg/m². BMI is above normal parameters. Recommendations include: educational material.              This document has been electronically signed by SALOMON CARBALLO MD March 23, 2021 14:49 EDT

## 2021-03-24 LAB — BACTERIA SPEC AEROBE CULT: NORMAL

## 2021-06-16 ENCOUNTER — TRANSCRIBE ORDERS (OUTPATIENT)
Dept: ADMINISTRATIVE | Facility: HOSPITAL | Age: 51
End: 2021-06-16

## 2021-06-16 DIAGNOSIS — Z12.31 ENCOUNTER FOR SCREENING MAMMOGRAM FOR MALIGNANT NEOPLASM OF BREAST: Primary | ICD-10-CM

## 2021-06-24 ENCOUNTER — OFFICE VISIT (OUTPATIENT)
Dept: UROLOGY | Facility: CLINIC | Age: 51
End: 2021-06-24

## 2021-06-24 VITALS — HEIGHT: 65 IN | WEIGHT: 210 LBS | BODY MASS INDEX: 34.99 KG/M2

## 2021-06-24 DIAGNOSIS — N28.1 RENAL CYST: Primary | ICD-10-CM

## 2021-06-24 PROCEDURE — 99213 OFFICE O/P EST LOW 20 MIN: CPT | Performed by: UROLOGY

## 2021-06-24 NOTE — PROGRESS NOTES
Chief Complaint:          Chief Complaint   Patient presents with   • renal cyst       HPI:   50 y.o. female 3-month follow-up of cyst.  In August 2020 she had an MRI scan.  She has ankle edema decreased force of stream.  Much of this is nephrologic in origin from this standpoint there is nothing else to offer but observation.  IMPRESSION:  1. Cortical thinning particularly on the left.  2. Tiny bilateral cortical cysts.   Past Medical History:        Past Medical History:   Diagnosis Date   • Bipolar 1 disorder (CMS/HCC)    • Depression    • High cholesterol    • Hypertension          Current Meds:     Current Outpatient Medications   Medication Sig Dispense Refill   • amLODIPine (NORVASC) 10 MG tablet Take  by mouth Daily.     • aspirin 81 MG EC tablet Take 81 mg by mouth Daily.     • buPROPion XL (WELLBUTRIN XL) 150 MG 24 hr tablet Take 150 mg by mouth.     • CALCIUM PO Take  by mouth 3 (Three) Times a Day.     • cetirizine (ZYRTEC ALLERGY) 10 MG tablet Take  by mouth.     • ciprofloxacin (Cipro) 500 MG tablet Take 0.5 tablets by mouth 2 (Two) Times a Day. 20 tablet 0   • cloNIDine (CATAPRES) 0.1 MG tablet Take 0.1 mg by mouth 2 (Two) Times a Day.     • diphenhydrAMINE-acetaminophen (TYLENOL PM EXTRA STRENGTH)  MG tablet per tablet Take  by mouth.     • Lurasidone HCl (Latuda) 80 MG tablet Take 1 tablet by mouth Daily.     • mirtazapine (REMERON) 30 MG tablet Take 30 mg by mouth Every Night.     • Multiple Vitamins-Minerals (WOMENS ONE DAILY PO) Take  by mouth.     • nitrofurantoin, macrocrystal-monohydrate, (Macrobid) 100 MG capsule Take 1 capsule by mouth Daily. 56 capsule 3   • rosuvastatin (CRESTOR) 20 MG tablet Take  by mouth.     • tiZANidine (ZANAFLEX) 4 MG tablet Take 4 mg by mouth.     • topiramate (TOPAMAX) 50 MG tablet Take  by mouth.       No current facility-administered medications for this visit.        Allergies:      Allergies   Allergen Reactions   • Egg Yolk Nausea Only        Past  Surgical History:     Past Surgical History:   Procedure Laterality Date   • CHOLECYSTECTOMY     • GASTRIC BYPASS     • HYSTERECTOMY     • TONSILLECTOMY           Social History:     Social History     Socioeconomic History   • Marital status:      Spouse name: Not on file   • Number of children: Not on file   • Years of education: Not on file   • Highest education level: Not on file   Tobacco Use   • Smoking status: Never Smoker   • Smokeless tobacco: Never Used   Substance and Sexual Activity   • Alcohol use: No   • Drug use: No       Family History:     Family History   Problem Relation Age of Onset   • Heart disease Father    • Hypertension Father    • Stroke Father    • Diabetes Father    • Hypertension Mother    • Hyperlipidemia Mother        Review of Systems:     Review of Systems   Constitutional: Negative.  Negative for activity change, appetite change, chills, diaphoresis, fatigue and unexpected weight change.   HENT: Negative for congestion, dental problem, drooling, ear discharge, ear pain, facial swelling, hearing loss, mouth sores, nosebleeds, postnasal drip, rhinorrhea, sinus pressure, sneezing, sore throat, tinnitus, trouble swallowing and voice change.    Eyes: Negative.  Negative for photophobia, pain, discharge, redness, itching and visual disturbance.   Respiratory: Negative.  Negative for apnea, cough, choking, chest tightness, shortness of breath, wheezing and stridor.    Cardiovascular: Negative.  Negative for chest pain, palpitations and leg swelling.   Gastrointestinal: Negative.  Negative for abdominal distention, abdominal pain, anal bleeding, blood in stool, constipation, diarrhea, nausea, rectal pain and vomiting.   Endocrine: Negative.  Negative for cold intolerance, heat intolerance, polydipsia, polyphagia and polyuria.   Musculoskeletal: Negative.  Negative for arthralgias, back pain, gait problem, joint swelling, myalgias, neck pain and neck stiffness.   Skin: Negative.   Negative for color change, pallor, rash and wound.   Allergic/Immunologic: Negative.  Negative for environmental allergies, food allergies and immunocompromised state.   Neurological: Negative.  Negative for dizziness, tremors, seizures, syncope, facial asymmetry, speech difficulty, weakness, light-headedness, numbness and headaches.   Hematological: Negative.  Negative for adenopathy. Does not bruise/bleed easily.   Psychiatric/Behavioral: Negative for agitation, behavioral problems, confusion, decreased concentration, dysphoric mood, hallucinations, self-injury, sleep disturbance and suicidal ideas. The patient is not nervous/anxious and is not hyperactive.    All other systems reviewed and are negative.      Physical Exam:     Physical Exam  Constitutional:       Appearance: She is well-developed.   HENT:      Head: Normocephalic and atraumatic.      Right Ear: External ear normal.      Left Ear: External ear normal.   Eyes:      Conjunctiva/sclera: Conjunctivae normal.      Pupils: Pupils are equal, round, and reactive to light.   Cardiovascular:      Rate and Rhythm: Normal rate and regular rhythm.      Heart sounds: Normal heart sounds.   Pulmonary:      Effort: Pulmonary effort is normal.      Breath sounds: Normal breath sounds.   Abdominal:      General: Bowel sounds are normal. There is no distension.      Palpations: Abdomen is soft. There is no mass.      Tenderness: There is no abdominal tenderness. There is no guarding or rebound.   Genitourinary:     Vagina: No vaginal discharge.   Musculoskeletal:         General: Normal range of motion.   Skin:     General: Skin is warm and dry.   Neurological:      Mental Status: She is alert.      Deep Tendon Reflexes: Reflexes are normal and symmetric.   Psychiatric:         Behavior: Behavior normal.         Thought Content: Thought content normal.         Judgment: Judgment normal.         I have reviewed the following portions of the patient's history:  allergies, current medications, past family history, past medical history, past social history, past surgical history, problem list and ROS and confirm it's accurate.      Procedure:       Assessment/Plan:   Renal cyst-I discussed the Bosniak classification of renal cysts.  I discussed the strict criteria involved with making a decision regarding intervention.  We discussed the fact that this is likely a Bosniak type I cyst which has sharp distinct borders and a thin wall and no internal echoes versus a Bosniak 2 with a thicker wall and faint striations.  We discussed the risks of malignancy in these situations is essentially 0 and requires no further intervention however we discussed the fact that a Bosniak 3 has about a 28% chance of malignancy and finally a Bosniak for probably is representative of a renal cell carcinoma variant.  He discussed the fact that these are generally asymptomatic and do not require intervention and that the appropriate surgical management is a radiographic cyst puncture when causing pain or problems particularly on the left with an early satiety MRI was personally reviewed                    This document has been electronically signed by SALOMON CARBALLO MD June 24, 2021 13:13 EDT

## 2022-07-07 ENCOUNTER — OFFICE VISIT (OUTPATIENT)
Dept: UROLOGY | Facility: CLINIC | Age: 52
End: 2022-07-07

## 2022-07-07 VITALS — BODY MASS INDEX: 34.99 KG/M2 | WEIGHT: 210 LBS | HEIGHT: 65 IN

## 2022-07-07 DIAGNOSIS — N28.1 RENAL CYST: Primary | ICD-10-CM

## 2022-07-07 PROCEDURE — 99213 OFFICE O/P EST LOW 20 MIN: CPT | Performed by: UROLOGY

## 2022-07-07 RX ORDER — CYCLOBENZAPRINE HCL 10 MG
TABLET ORAL
COMMUNITY
Start: 2022-06-15

## 2022-07-07 RX ORDER — TRAZODONE HYDROCHLORIDE 50 MG/1
TABLET ORAL
COMMUNITY
Start: 2022-06-15

## 2022-07-07 RX ORDER — BUPROPION HYDROCHLORIDE 300 MG/1
TABLET ORAL
COMMUNITY
Start: 2022-06-21

## 2022-07-07 RX ORDER — ARIPIPRAZOLE 5 MG/1
TABLET ORAL
COMMUNITY
Start: 2022-06-21

## 2022-07-07 RX ORDER — HYDROXYZINE PAMOATE 25 MG/1
CAPSULE ORAL
COMMUNITY
Start: 2022-06-21

## 2022-07-07 NOTE — PROGRESS NOTES
Chief Complaint:          Chief Complaint   Patient presents with   • renal cyst        HPI:   51 y.o. female 3-month follow-up of cyst.  In August 2020 she had an MRI scan.  She has ankle edema decreased force of stream.  Much of this is nephrologic in origin from this standpoint there is nothing else to offer but observation.  IMPRESSION:  1. Cortical thinning particularly on the left.  2. Tiny bilateral cortical cysts.  She returns today she has had no changes.  She has no stress incontinence.  He reports no lower urinary tract symptomatology, particularly irritative symptoms such as frequency, urgency, dysuria, and obstructive symptomatology, particularly dribbling, hesitancy, and intermittency.  Her last ultrasound dated 4/13/2022 showed a 10 mm cyst no changes whatsoever she does not need new medications I reviewed her ultrasonic examination performed at [facility] showing its completely stable.  And some parenchymal atrophy left greater than right I will plan to see her back in 1 year with a new renal ultrasound prior to her visit      Past Medical History:        Past Medical History:   Diagnosis Date   • Bipolar 1 disorder (HCC)    • Depression    • High cholesterol    • Hypertension          Current Meds:     Current Outpatient Medications   Medication Sig Dispense Refill   • amLODIPine (NORVASC) 10 MG tablet Take  by mouth Daily.     • ARIPiprazole (ABILIFY) 5 MG tablet TAKE 1 TABLET BY MOUTH ONCE DAILY AS DIRECTED BY YOUR PROVIDER     • aspirin 81 MG EC tablet Take 81 mg by mouth Daily.     • buPROPion XL (WELLBUTRIN XL) 150 MG 24 hr tablet Take 150 mg by mouth.     • buPROPion XL (WELLBUTRIN XL) 300 MG 24 hr tablet TAKE 1 TABLET BY MOUTH EVERY EVENING AS DIRECTED BY YOUR PROVIDER     • CALCIUM PO Take  by mouth 3 (Three) Times a Day.     • cetirizine (zyrTEC) 10 MG tablet Take  by mouth.     • ciprofloxacin (Cipro) 500 MG tablet Take 0.5 tablets by mouth 2 (Two) Times a Day. 20 tablet 0   • cloNIDine  (CATAPRES) 0.1 MG tablet Take 0.1 mg by mouth 2 (Two) Times a Day.     • cyclobenzaprine (FLEXERIL) 10 MG tablet TAKE 1 TABLET BY MOUTH 2 TIMES A DAY FOR MUSCLE SPASMS     • diphenhydrAMINE-acetaminophen (TYLENOL PM)  MG tablet per tablet Take  by mouth.     • hydrOXYzine pamoate (VISTARIL) 25 MG capsule TAKE 1 CAPSULE BY MOUTH EVERY 6 TO 8 HOURS AS DIRECTED FOR FOR ANXIETY     • Lurasidone HCl (LATUDA) 80 MG tablet tablet Take 1 tablet by mouth Daily.     • mirtazapine (REMERON) 30 MG tablet Take 30 mg by mouth Every Night.     • Multiple Vitamins-Minerals (WOMENS ONE DAILY PO) Take  by mouth.     • nitrofurantoin, macrocrystal-monohydrate, (Macrobid) 100 MG capsule Take 1 capsule by mouth Daily. 56 capsule 3   • rosuvastatin (CRESTOR) 20 MG tablet Take  by mouth.     • tiZANidine (ZANAFLEX) 4 MG tablet Take 4 mg by mouth.     • topiramate (TOPAMAX) 50 MG tablet Take  by mouth.     • traZODone (DESYREL) 50 MG tablet TAKE 1/2 TO 1 TABLET BY MOUTH EVERY NIGHT AT BEDTIME FOR SLEEP       No current facility-administered medications for this visit.        Allergies:      Allergies   Allergen Reactions   • Egg Yolk Nausea Only        Past Surgical History:     Past Surgical History:   Procedure Laterality Date   • CHOLECYSTECTOMY     • GASTRIC BYPASS     • HYSTERECTOMY     • TONSILLECTOMY           Social History:     Social History     Socioeconomic History   • Marital status:    Tobacco Use   • Smoking status: Former Smoker     Packs/day: 1.50     Years: 25.00     Pack years: 37.50     Types: Cigarettes     Start date: 1981     Quit date: 2006     Years since quittin.1   • Smokeless tobacco: Never Used   Substance and Sexual Activity   • Alcohol use: No   • Drug use: No   • Sexual activity: Not Currently     Partners: Male     Birth control/protection: Abstinence, Post-menopausal       Family History:     Family History   Problem Relation Age of Onset   • Heart disease Father    •  Hypertension Father    • Stroke Father    • Diabetes Father    • Hypertension Mother    • Hyperlipidemia Mother        Review of Systems:     Review of Systems   Constitutional: Negative.  Negative for activity change, appetite change, chills, diaphoresis, fatigue and unexpected weight change.   HENT: Negative for congestion, dental problem, drooling, ear discharge, ear pain, facial swelling, hearing loss, mouth sores, nosebleeds, postnasal drip, rhinorrhea, sinus pressure, sneezing, sore throat, tinnitus, trouble swallowing and voice change.    Eyes: Negative.  Negative for photophobia, pain, discharge, redness, itching and visual disturbance.   Respiratory: Negative.  Negative for apnea, cough, choking, chest tightness, shortness of breath, wheezing and stridor.    Cardiovascular: Negative.  Negative for chest pain, palpitations and leg swelling.   Gastrointestinal: Negative.  Negative for abdominal distention, abdominal pain, anal bleeding, blood in stool, constipation, diarrhea, nausea, rectal pain and vomiting.   Endocrine: Negative.  Negative for cold intolerance, heat intolerance, polydipsia, polyphagia and polyuria.   Musculoskeletal: Negative.  Negative for arthralgias, back pain, gait problem, joint swelling, myalgias, neck pain and neck stiffness.   Skin: Negative.  Negative for color change, pallor, rash and wound.   Allergic/Immunologic: Negative.  Negative for environmental allergies, food allergies and immunocompromised state.   Neurological: Negative.  Negative for dizziness, tremors, seizures, syncope, facial asymmetry, speech difficulty, weakness, light-headedness, numbness and headaches.   Hematological: Negative.  Negative for adenopathy. Does not bruise/bleed easily.   Psychiatric/Behavioral: Negative for agitation, behavioral problems, confusion, decreased concentration, dysphoric mood, hallucinations, self-injury, sleep disturbance and suicidal ideas. The patient is not nervous/anxious and is  not hyperactive.    All other systems reviewed and are negative.      Physical Exam:     Physical Exam  Constitutional:       Appearance: She is well-developed.   HENT:      Head: Normocephalic and atraumatic.      Right Ear: External ear normal.      Left Ear: External ear normal.   Eyes:      Conjunctiva/sclera: Conjunctivae normal.      Pupils: Pupils are equal, round, and reactive to light.   Cardiovascular:      Rate and Rhythm: Normal rate and regular rhythm.      Heart sounds: Normal heart sounds.   Pulmonary:      Effort: Pulmonary effort is normal.      Breath sounds: Normal breath sounds.   Abdominal:      General: Bowel sounds are normal. There is no distension.      Palpations: Abdomen is soft. There is no mass.      Tenderness: There is no abdominal tenderness. There is no guarding or rebound.   Genitourinary:     Vagina: No vaginal discharge.   Musculoskeletal:         General: Normal range of motion.   Skin:     General: Skin is warm and dry.   Neurological:      Mental Status: She is alert.      Deep Tendon Reflexes: Reflexes are normal and symmetric.   Psychiatric:         Behavior: Behavior normal.         Thought Content: Thought content normal.         Judgment: Judgment normal.         I have reviewed the following portions of the patient's history: Allergies, current medications, past family history, past medical history, past social history, past surgical history, problem list, and ROS and confirm it is accurate.      Recent Image (CT and/or KUB):      CT Abdomen and Pelvis: No results found for this or any previous visit.       CT Stone Protocol: Results for orders placed during the hospital encounter of 10/23/18    CT Abdomen Pelvis Stone Protocol    Narrative  CT ABDOMEN PELVIS STONE PROTOCOL-    REASON FOR EXAM: renal lesion; N28.1-Cyst of kidney, acquired    No previous CTs are available for comparison.    Scans were obtained from the lung bases and extended through the pelvis.  There  were no pulmonary parenchymal infiltrates or pleural effusions in  the lung bases. The liver and spleen were normal in size and shape. The  gallbladder is surgically absent. There are postoperative changes seen  in the stomach that would be consistent with gastric partitioning  surgery. The pancreas was unremarkable. No masses were noted in the  adrenal glands. The kidneys showed no evidence of obstruction. The left  kidney is small in size with diffuse thinning of the cortex. There is no  ascites or free air. The aorta is normal in caliber. The bowel shows no  evidence of obstruction or inflammation. The appendix was unremarkable.  In the pelvis the uterus was absent. No pelvic masses or fluid  collections demonstrated.    Impression  Small left kidney with diffuse thinning of the cortical  tissue. There is no evidence of obstruction to either kidney. A focal  renal lesion was not identified.  1008.58 mGy.cm  The radiation dose reduction device was utilized for each scan per the  ALARA (as low as reasonably achievable) protocol.    This report was finalized on 10/23/2018 1:47 PM by Dr. Kane Cochran II, MD.       KUB: No results found for this or any previous visit.       Labs (past 3 months):      No visits with results within 3 Month(s) from this visit.   Latest known visit with results is:   Office Visit on 03/23/2021   Component Date Value Ref Range Status   • Color 03/23/2021 Yellow  Yellow, Straw, Dark Yellow, Natty Final   • Clarity, UA 03/23/2021 Cloudy (A) Clear Final   • Specific Gravity  03/23/2021 1.020  1.005 - 1.030 Final   • pH, Urine 03/23/2021 6.0  5.0 - 8.0 Final   • Leukocytes 03/23/2021 Moderate (2+) (A) Negative Final   • Nitrite, UA 03/23/2021 Negative  Negative Final   • Protein, POC 03/23/2021 Negative  Negative mg/dL Final   • Glucose, UA 03/23/2021 Negative  Negative, 1000 mg/dL (3+) mg/dL Final   • Ketones, UA 03/23/2021 Negative  Negative Final   • Urobilinogen, UA 03/23/2021 Normal   Normal Final   • Bilirubin 03/23/2021 1 mg/dL (A) Negative Final   • Blood, UA 03/23/2021 Negative  Negative Final   • Urine Culture 03/23/2021 >100,000 CFU/mL Mixed Debbie Isolated   Final        Procedure:       Assessment/Plan:   Renal cyst-I discussed the Bosniak classification of renal cysts.  I discussed the strict criteria involved with making a decision regarding intervention.  We discussed the fact that this is likely a Bosniak type I cyst which has sharp distinct borders and a thin wall and no internal echoes versus a Bosniak 2 with a thicker wall and faint striations.  We discussed the risk of malignancy in these situations is essentially zero and requires no further intervention.  However, we discussed the fact that a Bosniak 3 has about a 28% chance of malignancy and finally a Bosniak 4 probably is representative of a renal cell carcinoma variant.  We discussed the fact that these are generally asymptomatic and do not require intervention and that the appropriate surgical management is a radiographic cyst puncture when causing pain or problems, particularly on the left with an early satiety.  MRI originally stated it was a simple Bosniak 1 cyst it is unchanged I will see her back in 1 year with a renal ultrasound.                           This document has been electronically signed by SALOMON CARBALLO MD July 7, 2022 09:27 EDT

## 2023-12-15 NOTE — PROGRESS NOTES
Patient: Zena Robledo    YOB: 1970    Date: 12/18/2023    Primary Care Provider: Mynor Walter MD    Chief Complaint   Patient presents with    Anemia       Subjective .     History of present illness:  I saw the patient in the office today as a consultation for evaluation and treatment of anemia. Recent labs did show a hemoglobin of 9.8. Patient states here for colonoscopy, no previous colonoscopy. No family history of colon cancer.    She is never had a previous colonoscopy, she has had a previous Bj-en-Y gastric bypass performed in 2007 at [facility].      The following portions of the patient's history were reviewed and updated as appropriate: allergies, current medications, past family history, past medical history, past social history, past surgical history and problem list.    Review of Systems   Constitutional:  Negative for chills, fever and unexpected weight change.   HENT:  Negative for hearing loss, trouble swallowing and voice change.    Eyes:  Negative for visual disturbance.   Respiratory:  Negative for apnea, cough, chest tightness, shortness of breath and wheezing.    Cardiovascular:  Negative for chest pain, palpitations and leg swelling.   Gastrointestinal:  Negative for abdominal distention, abdominal pain, anal bleeding, blood in stool, constipation, diarrhea, nausea, rectal pain and vomiting.   Endocrine: Negative for cold intolerance and heat intolerance.   Genitourinary:  Negative for difficulty urinating, dysuria and flank pain.   Musculoskeletal:  Negative for back pain and gait problem.   Skin:  Negative for color change, rash and wound.   Neurological:  Negative for dizziness, syncope, speech difficulty, weakness, light-headedness, numbness and headaches.   Hematological:  Negative for adenopathy. Does not bruise/bleed easily.   Psychiatric/Behavioral:  Negative for confusion. The patient is not nervous/anxious.        History:  Past Medical  History:   Diagnosis Date    Bipolar 1 disorder     Depression     High cholesterol     Hypertension        Past Surgical History:   Procedure Laterality Date    CHOLECYSTECTOMY      GASTRIC BYPASS      HYSTERECTOMY      TONSILLECTOMY         Family History   Problem Relation Age of Onset    Heart disease Father     Hypertension Father     Stroke Father     Diabetes Father     Hypertension Mother     Hyperlipidemia Mother        Social History     Tobacco Use    Smoking status: Former     Packs/day: 1.50     Years: 25.00     Additional pack years: 0.00     Total pack years: 37.50     Types: Cigarettes     Start date: 1981     Quit date: 2006     Years since quittin.5    Smokeless tobacco: Never   Vaping Use    Vaping Use: Never used   Substance Use Topics    Alcohol use: No    Drug use: No       Medications:    Current Outpatient Medications:     amLODIPine (NORVASC) 10 MG tablet, Take 0.5 tablets by mouth Daily., Disp: , Rfl:     ARIPiprazole (ABILIFY) 5 MG tablet, 7 mg., Disp: , Rfl:     buPROPion XL (WELLBUTRIN XL) 150 MG 24 hr tablet, Take 1 tablet by mouth., Disp: , Rfl:     CALCIUM PO, Take  by mouth 3 (Three) Times a Day., Disp: , Rfl:     cetirizine (zyrTEC) 10 MG tablet, Take  by mouth., Disp: , Rfl:     cloNIDine (CATAPRES) 0.1 MG tablet, Take 1 tablet by mouth 2 (Two) Times a Day., Disp: , Rfl:     cyclobenzaprine (FLEXERIL) 10 MG tablet, TAKE 1 TABLET BY MOUTH 2 TIMES A DAY FOR MUSCLE SPASMS, Disp: , Rfl:     famotidine (PEPCID) 20 MG tablet, Take 1 tablet by mouth 2 (Two) Times a Day., Disp: , Rfl:     hydrOXYzine pamoate (VISTARIL) 25 MG capsule, TAKE 1 CAPSULE BY MOUTH EVERY 6 TO 8 HOURS AS DIRECTED FOR FOR ANXIETY, Disp: , Rfl:     mirtazapine (REMERON) 30 MG tablet, Take 1 tablet by mouth Every Night., Disp: , Rfl:     Multiple Vitamins-Minerals (WOMENS ONE DAILY PO), Take  by mouth., Disp: , Rfl:     naproxen sodium (ALEVE) 220 MG tablet, Take 1 tablet by mouth 2 (Two) Times a Day As  "Needed., Disp: , Rfl:     rosuvastatin (CRESTOR) 20 MG tablet, Take 2 tablets by mouth., Disp: , Rfl:     topiramate (TOPAMAX) 50 MG tablet, Take  by mouth., Disp: , Rfl:     vitamin E 100 UNIT capsule, Take 180 Units by mouth Daily., Disp: , Rfl:     aspirin 81 MG EC tablet, Take 81 mg by mouth Daily., Disp: , Rfl:     buPROPion XL (WELLBUTRIN XL) 300 MG 24 hr tablet, TAKE 1 TABLET BY MOUTH EVERY EVENING AS DIRECTED BY YOUR PROVIDER (Patient not taking: Reported on 12/18/2023), Disp: , Rfl:     ciprofloxacin (Cipro) 500 MG tablet, Take 0.5 tablets by mouth 2 (Two) Times a Day. (Patient not taking: Reported on 7/6/2023), Disp: 20 tablet, Rfl: 0    diphenhydrAMINE-acetaminophen (TYLENOL PM)  MG tablet per tablet, Take  by mouth. (Patient not taking: Reported on 12/18/2023), Disp: , Rfl:     Lurasidone HCl (LATUDA) 80 MG tablet tablet, Take 1 tablet by mouth Daily. (Patient not taking: Reported on 7/6/2023), Disp: , Rfl:     nitrofurantoin, macrocrystal-monohydrate, (Macrobid) 100 MG capsule, Take 1 capsule by mouth Daily., Disp: 56 capsule, Rfl: 3    tiZANidine (ZANAFLEX) 4 MG tablet, Take 1 tablet by mouth., Disp: , Rfl:     traZODone (DESYREL) 50 MG tablet, TAKE 1/2 TO 1 TABLET BY MOUTH EVERY NIGHT AT BEDTIME FOR SLEEP, Disp: , Rfl:      Allergies:  Allergies   Allergen Reactions    Egg Yolk Nausea Only       Objective     Vital Signs:   Vitals:    12/18/23 1122   BP: 138/88   BP Location: Left arm   Patient Position: Sitting   Pulse: 85   Temp: 97.8 °F (36.6 °C)   TempSrc: Temporal   SpO2: 98%   Weight: 89.1 kg (196 lb 6.4 oz)   Height: 165.1 cm (65\")       Physical Exam:   General Appearance:    Alert, cooperative, in no acute distress   Head:    Normocephalic, without obvious abnormality, atraumatic   Eyes:            Lids and lashes normal, conjunctivae and sclerae normal, no   icterus, no pallor, corneas clear, PERRL   Ears:    Ears appear intact with no abnormalities noted   Throat:   No oral lesions, " no thrush, oral mucosa moist   Neck:   No adenopathy, supple, trachea midline, no thyromegaly,  no JVD   Lungs/respiratory:     Clear to auscultation,respirations regular, even and                  unlabored    Heart/cardiovascular:    Regular rhythm and normal rate, normal S1 and S2, no            murmur   Abdomen:     no masses, no organomegaly, soft non-tender, non-distended, no guarding, no peritoneal signs   Extremities:   Moves all extremities well, no edema, no cyanosis, no             redness   Pulses:   Pulses palpable and equal bilaterally   Skin:   No bleeding, bruising or rash   Lymph nodes:   No palpable adenopathy   Neurologic:   Cranial nerves 2 - 12 grossly intact, sensation intact   Psychiatric: No evidence of depression or anxiety      Results Review:   I reviewed the patient's new clinical results.  I reviewed the patient's new imaging results and agree with the interpretation.  I reviewed the patient's other test results and agree with the interpretation    Review of Systems was reviewed and confirmed as accurate as documented by the MA.    Assessment & Plan :    1. Iron deficiency anemia due to chronic blood loss        I recommend a EGD for further evaluation.  The procedure as well as the risks were explained which include but are not limited to bleeding, infection, intestinal perforation, Aspiration etc. were explained and the patient understood all of the above and wishes to proceed with an EGD.    She most likely will need a colonoscopy at a future date.    Electronically signed by Miguelito Castaneda MD  12/18/23  09:53 EST

## 2023-12-18 ENCOUNTER — OFFICE VISIT (OUTPATIENT)
Dept: SURGERY | Facility: CLINIC | Age: 53
End: 2023-12-18
Payer: COMMERCIAL

## 2023-12-18 VITALS
TEMPERATURE: 97.8 F | BODY MASS INDEX: 32.72 KG/M2 | HEIGHT: 65 IN | DIASTOLIC BLOOD PRESSURE: 88 MMHG | HEART RATE: 85 BPM | OXYGEN SATURATION: 98 % | WEIGHT: 196.4 LBS | SYSTOLIC BLOOD PRESSURE: 138 MMHG

## 2023-12-18 DIAGNOSIS — D50.0 IRON DEFICIENCY ANEMIA DUE TO CHRONIC BLOOD LOSS: Primary | ICD-10-CM

## 2023-12-18 PROCEDURE — 99204 OFFICE O/P NEW MOD 45 MIN: CPT | Performed by: SURGERY

## 2023-12-18 RX ORDER — NAPROXEN SODIUM 220 MG
220 TABLET ORAL 2 TIMES DAILY PRN
COMMUNITY

## 2023-12-18 RX ORDER — FAMOTIDINE 20 MG/1
20 TABLET, FILM COATED ORAL 2 TIMES DAILY
COMMUNITY

## 2024-01-04 ENCOUNTER — TELEPHONE (OUTPATIENT)
Dept: SURGERY | Facility: CLINIC | Age: 54
End: 2024-01-04
Payer: COMMERCIAL

## 2024-01-09 ENCOUNTER — OUTSIDE FACILITY SERVICE (OUTPATIENT)
Dept: SURGERY | Facility: CLINIC | Age: 54
End: 2024-01-09
Payer: COMMERCIAL

## 2024-01-18 NOTE — PROGRESS NOTES
Patient: Zena Robledo    YOB: 1970    Date: 01/22/2024    Primary Care Provider: Mynor Walter MD    Chief Complaint   Patient presents with    Follow-up     EGD    Colonoscopy     Eval        SUBJECTIVE:    History of present illness:  Patient has a history significant for anemia.  I saw the patient in the office today as a follow-up EGD with biopsies which was performed 01/09/2024.  Stomach cardia biopsy showed oxyntic type mucosa with reactive changes.  Patient is also here for evaluation for a colonoscopy for further work-up for anemia.    She has had a previous Bj-en-Y gastric bypass.  She did have a small ulcer noted at the anastomosis and biopsies were taken, there was no evidence of bacteria.  She has not started on her previously prescribed Prilosec and I have asked her to do so.    The following portions of the patient's history were reviewed and updated as appropriate: allergies, current medications, past family history, past medical history, past social history, past surgical history and problem list.    Review of Systems   Constitutional:  Negative for chills, fever and unexpected weight change.   HENT:  Negative for hearing loss, trouble swallowing and voice change.    Eyes:  Negative for visual disturbance.   Respiratory:  Negative for apnea, cough, chest tightness, shortness of breath and wheezing.    Cardiovascular:  Negative for chest pain, palpitations and leg swelling.   Gastrointestinal:  Negative for abdominal distention, abdominal pain, anal bleeding, blood in stool, constipation, diarrhea, nausea, rectal pain and vomiting.   Endocrine: Negative for cold intolerance and heat intolerance.   Genitourinary:  Negative for difficulty urinating, dysuria and flank pain.   Musculoskeletal:  Negative for back pain and gait problem.   Skin:  Negative for color change, rash and wound.   Neurological:  Negative for dizziness, syncope, speech difficulty, weakness,  light-headedness, numbness and headaches.   Hematological:  Negative for adenopathy. Does not bruise/bleed easily.   Psychiatric/Behavioral:  Negative for confusion. The patient is not nervous/anxious.        History:  Past Medical History:   Diagnosis Date    Bipolar 1 disorder     Depression     High cholesterol     Hypertension        Past Surgical History:   Procedure Laterality Date    CHOLECYSTECTOMY      GASTRIC BYPASS      HYSTERECTOMY      TONSILLECTOMY         Family History   Problem Relation Age of Onset    Heart disease Father     Hypertension Father     Stroke Father     Diabetes Father     Hypertension Mother     Hyperlipidemia Mother        Social History     Tobacco Use    Smoking status: Former     Packs/day: 1.50     Years: 25.00     Additional pack years: 0.00     Total pack years: 37.50     Types: Cigarettes     Start date: 1981     Quit date: 2006     Years since quittin.6    Smokeless tobacco: Never   Vaping Use    Vaping Use: Never used   Substance Use Topics    Alcohol use: No    Drug use: No       Allergies:  Allergies   Allergen Reactions    Egg Yolk Nausea Only       Medications:    Current Outpatient Medications:     amLODIPine (NORVASC) 10 MG tablet, Take 0.5 tablets by mouth Daily., Disp: , Rfl:     ARIPiprazole (ABILIFY) 5 MG tablet, 7 mg., Disp: , Rfl:     busPIRone (BUSPAR) 5 MG tablet, , Disp: , Rfl:     CALCIUM PO, Take  by mouth 3 (Three) Times a Day., Disp: , Rfl:     cetirizine (zyrTEC) 10 MG tablet, Take  by mouth., Disp: , Rfl:     cloNIDine (CATAPRES) 0.1 MG tablet, Take 1 tablet by mouth 2 (Two) Times a Day., Disp: , Rfl:     cyclobenzaprine (FLEXERIL) 10 MG tablet, TAKE 1 TABLET BY MOUTH 2 TIMES A DAY FOR MUSCLE SPASMS, Disp: , Rfl:     famotidine (PEPCID) 20 MG tablet, Take 1 tablet by mouth 2 (Two) Times a Day., Disp: , Rfl:     hydrOXYzine pamoate (VISTARIL) 25 MG capsule, TAKE 1 CAPSULE BY MOUTH EVERY 6 TO 8 HOURS AS DIRECTED FOR FOR ANXIETY, Disp: , Rfl:      mirtazapine (REMERON) 30 MG tablet, Take 1 tablet by mouth Every Night., Disp: , Rfl:     Multiple Vitamins-Minerals (WOMENS ONE DAILY PO), Take  by mouth., Disp: , Rfl:     naproxen sodium (ALEVE) 220 MG tablet, Take 1 tablet by mouth 2 (Two) Times a Day As Needed., Disp: , Rfl:     rosuvastatin (CRESTOR) 20 MG tablet, Take 2 tablets by mouth., Disp: , Rfl:     tiZANidine (ZANAFLEX) 4 MG tablet, Take 1 tablet by mouth., Disp: , Rfl:     topiramate (TOPAMAX) 50 MG tablet, Take  by mouth., Disp: , Rfl:     vitamin E 100 UNIT capsule, Take 180 Units by mouth Daily., Disp: , Rfl:     aspirin 81 MG EC tablet, Take 81 mg by mouth Daily. (Patient not taking: Reported on 1/22/2024), Disp: , Rfl:     buPROPion XL (WELLBUTRIN XL) 150 MG 24 hr tablet, Take 1 tablet by mouth. (Patient not taking: Reported on 1/22/2024), Disp: , Rfl:     buPROPion XL (WELLBUTRIN XL) 300 MG 24 hr tablet, TAKE 1 TABLET BY MOUTH EVERY EVENING AS DIRECTED BY YOUR PROVIDER (Patient not taking: Reported on 12/18/2023), Disp: , Rfl:     ciprofloxacin (Cipro) 500 MG tablet, Take 0.5 tablets by mouth 2 (Two) Times a Day. (Patient not taking: Reported on 7/6/2023), Disp: 20 tablet, Rfl: 0    diphenhydrAMINE-acetaminophen (TYLENOL PM)  MG tablet per tablet, Take  by mouth. (Patient not taking: Reported on 12/18/2023), Disp: , Rfl:     Lurasidone HCl (LATUDA) 80 MG tablet tablet, Take 1 tablet by mouth Daily. (Patient not taking: Reported on 7/6/2023), Disp: , Rfl:     nitrofurantoin, macrocrystal-monohydrate, (Macrobid) 100 MG capsule, Take 1 capsule by mouth Daily. (Patient not taking: Reported on 1/22/2024), Disp: 56 capsule, Rfl: 3    traZODone (DESYREL) 50 MG tablet, TAKE 1/2 TO 1 TABLET BY MOUTH EVERY NIGHT AT BEDTIME FOR SLEEP (Patient not taking: Reported on 1/22/2024), Disp: , Rfl:     OBJECTIVE:    Vital Signs:   Vitals:    01/22/24 1027   BP: 138/88   Pulse: 82   Temp: 97.1 °F (36.2 °C)   SpO2: 96%   Weight: 90 kg (198 lb 6.4 oz)  "  Height: 165.1 cm (65\")       Physical Exam:   General Appearance:    Alert, cooperative, in no acute distress   Head:    Normocephalic, without obvious abnormality, atraumatic   Eyes:            Lids and lashes normal, conjunctivae and sclerae normal, no   icterus, no pallor, corneas clear, PERRL   Ears:    Ears appear intact with no abnormalities noted   Throat:   No oral lesions, no thrush, oral mucosa moist   Neck:   No adenopathy, supple, trachea midline, no thyromegaly,  no JVD   Lungs:     Clear to auscultation,respirations regular, even and                  unlabored    Heart:    Regular rhythm and normal rate, normal S1 and S2, no            murmur   Abdomen:     no masses, no organomegaly, soft non-tender, non-distended, no guarding, there is no evidence of tenderness, no peritoneal signs   Extremities:   Moves all extremities well, no edema, no cyanosis, no             redness   Pulses:   Pulses palpable and equal bilaterally   Skin:   No bleeding, bruising or rash   Lymph nodes:   No palpable adenopathy   Neurologic:   Cranial nerves 2 - 12 grossly intact, sensation intact      Results Review:   I reviewed the patient's new clinical results.  I reviewed the patient's new imaging results and agree with the interpretation.  I reviewed the patient's other test results and agree with the interpretation    Review of Systems was reviewed and confirmed as accurate as documented by the MA.    ASSESSMENT/PLAN:    1. Iron deficiency anemia due to chronic blood loss        I recommend a colonoscopy for further evaluation. The procedure was explained as well as the risks which include but are not limited to bleeding, infection, perforation, abdominal pain etc. The patient understands these risks and the procedure and wishes to proceed.     Electronically signed by Miguelito Castaneda MD  01/22/24 09:18 EST    Answers submitted by the patient for this visit:  Other (Submitted on 1/22/2024)  Please describe your " symptoms.: Low blood count  Have you had these symptoms before?: No  How long have you been having these symptoms?: Greater than 2 weeks  Please list any medications you are currently taking for this condition.: Iron  Primary Reason for Visit (Submitted on 1/22/2024)  What is the primary reason for your visit?: Other

## 2024-01-22 ENCOUNTER — OFFICE VISIT (OUTPATIENT)
Dept: SURGERY | Facility: CLINIC | Age: 54
End: 2024-01-22
Payer: COMMERCIAL

## 2024-01-22 VITALS
DIASTOLIC BLOOD PRESSURE: 88 MMHG | HEART RATE: 82 BPM | SYSTOLIC BLOOD PRESSURE: 138 MMHG | OXYGEN SATURATION: 96 % | BODY MASS INDEX: 33.05 KG/M2 | HEIGHT: 65 IN | WEIGHT: 198.4 LBS | TEMPERATURE: 97.1 F

## 2024-01-22 DIAGNOSIS — D50.0 IRON DEFICIENCY ANEMIA DUE TO CHRONIC BLOOD LOSS: Primary | ICD-10-CM

## 2024-01-22 PROCEDURE — 99214 OFFICE O/P EST MOD 30 MIN: CPT | Performed by: SURGERY

## 2024-01-22 RX ORDER — POLYETHYLENE GLYCOL 3350 17 G/17G
238 POWDER, FOR SOLUTION ORAL ONCE
Qty: 238 G | Refills: 0 | Status: SHIPPED | OUTPATIENT
Start: 2024-01-22 | End: 2024-01-22

## 2024-01-22 RX ORDER — BISACODYL 5 MG/1
TABLET, DELAYED RELEASE ORAL
Qty: 4 TABLET | Refills: 0 | Status: SHIPPED | OUTPATIENT
Start: 2024-01-22

## 2024-01-22 RX ORDER — BUSPIRONE HYDROCHLORIDE 5 MG/1
TABLET ORAL
COMMUNITY
Start: 2024-01-02

## 2024-02-08 ENCOUNTER — TELEPHONE (OUTPATIENT)
Dept: SURGERY | Facility: CLINIC | Age: 54
End: 2024-02-08
Payer: COMMERCIAL

## 2024-02-08 NOTE — TELEPHONE ENCOUNTER
Called to confirm colonoscopy, 02/13/24, Dr Castaneda @ [facility] no answer,left voice message

## 2024-02-13 ENCOUNTER — OUTSIDE FACILITY SERVICE (OUTPATIENT)
Dept: SURGERY | Facility: CLINIC | Age: 54
End: 2024-02-13
Payer: COMMERCIAL

## 2024-07-05 ENCOUNTER — HOSPITAL ENCOUNTER (OUTPATIENT)
Dept: ULTRASOUND IMAGING | Facility: HOSPITAL | Age: 54
Discharge: HOME | End: 2024-07-05
Admitting: UROLOGY
Payer: COMMERCIAL

## 2024-07-05 DIAGNOSIS — N28.1 RENAL CYST: ICD-10-CM

## 2024-07-05 PROCEDURE — 76775 US EXAM ABDO BACK WALL LIM: CPT

## 2024-07-09 ENCOUNTER — OFFICE VISIT (OUTPATIENT)
Dept: UROLOGY | Facility: CLINIC | Age: 54
End: 2024-07-09
Payer: COMMERCIAL

## 2024-07-09 VITALS
HEIGHT: 65 IN | BODY MASS INDEX: 33.15 KG/M2 | HEART RATE: 78 BPM | DIASTOLIC BLOOD PRESSURE: 88 MMHG | WEIGHT: 199 LBS | SYSTOLIC BLOOD PRESSURE: 142 MMHG

## 2024-07-09 DIAGNOSIS — N28.1 RENAL CYST: Primary | ICD-10-CM

## 2024-07-09 PROCEDURE — 99213 OFFICE O/P EST LOW 20 MIN: CPT | Performed by: UROLOGY

## 2024-07-09 RX ORDER — LOSARTAN POTASSIUM 50 MG/1
50 TABLET ORAL DAILY
COMMUNITY

## 2024-07-09 RX ORDER — HYDROCHLOROTHIAZIDE 12.5 MG/1
12.5 CAPSULE, GELATIN COATED ORAL DAILY
Qty: 30 CAPSULE | Refills: 3 | Status: SHIPPED | OUTPATIENT
Start: 2024-07-09

## 2024-07-09 NOTE — PROGRESS NOTES
Chief Complaint:      Chief Complaint   Patient presents with    Renal Cyst        HPI:   53 y.o. female returns today has a renal cyst that is unchanged I reviewed all the prior films dating back to 2017 she is asymptomatic I will start her on low-dose thiazide I recommend her staying on this for severe ankle edema.  I will see him back in 1 year    Past Medical History:     Past Medical History:   Diagnosis Date    Bipolar 1 disorder     Depression     High cholesterol     Hypertension        Current Meds:     Current Outpatient Medications   Medication Sig Dispense Refill    amLODIPine (NORVASC) 10 MG tablet Take 0.5 tablets by mouth Daily.      ARIPiprazole (ABILIFY) 5 MG tablet 7 mg.      aspirin 81 MG EC tablet Take 81 mg by mouth Daily. (Patient not taking: Reported on 1/22/2024)      bisacodyl (Dulcolax) 5 MG EC tablet Take 2 @ 3pm, 2 @ 7 pm day prior to colonoscopy 4 tablet 0    buPROPion XL (WELLBUTRIN XL) 150 MG 24 hr tablet Take 1 tablet by mouth. (Patient not taking: Reported on 1/22/2024)      buPROPion XL (WELLBUTRIN XL) 300 MG 24 hr tablet TAKE 1 TABLET BY MOUTH EVERY EVENING AS DIRECTED BY YOUR PROVIDER (Patient not taking: Reported on 12/18/2023)      busPIRone (BUSPAR) 5 MG tablet       CALCIUM PO Take  by mouth 3 (Three) Times a Day.      cetirizine (zyrTEC) 10 MG tablet Take  by mouth.      ciprofloxacin (Cipro) 500 MG tablet Take 0.5 tablets by mouth 2 (Two) Times a Day. (Patient not taking: Reported on 7/6/2023) 20 tablet 0    cloNIDine (CATAPRES) 0.1 MG tablet Take 1 tablet by mouth 2 (Two) Times a Day.      cyclobenzaprine (FLEXERIL) 10 MG tablet TAKE 1 TABLET BY MOUTH 2 TIMES A DAY FOR MUSCLE SPASMS      diphenhydrAMINE-acetaminophen (TYLENOL PM)  MG tablet per tablet Take  by mouth. (Patient not taking: Reported on 12/18/2023)      famotidine (PEPCID) 20 MG tablet Take 1 tablet by mouth 2 (Two) Times a Day.      hydrOXYzine pamoate (VISTARIL) 25 MG capsule TAKE 1 CAPSULE BY MOUTH  EVERY 6 TO 8 HOURS AS DIRECTED FOR FOR ANXIETY      Lurasidone HCl (LATUDA) 80 MG tablet tablet Take 1 tablet by mouth Daily. (Patient not taking: Reported on 2023)      mirtazapine (REMERON) 30 MG tablet Take 1 tablet by mouth Every Night.      Multiple Vitamins-Minerals (WOMENS ONE DAILY PO) Take  by mouth.      naproxen sodium (ALEVE) 220 MG tablet Take 1 tablet by mouth 2 (Two) Times a Day As Needed.      nitrofurantoin, macrocrystal-monohydrate, (Macrobid) 100 MG capsule Take 1 capsule by mouth Daily. (Patient not taking: Reported on 2024) 56 capsule 3    rosuvastatin (CRESTOR) 20 MG tablet Take 2 tablets by mouth.      tiZANidine (ZANAFLEX) 4 MG tablet Take 1 tablet by mouth.      topiramate (TOPAMAX) 50 MG tablet Take  by mouth.      traZODone (DESYREL) 50 MG tablet TAKE 1/2 TO 1 TABLET BY MOUTH EVERY NIGHT AT BEDTIME FOR SLEEP (Patient not taking: Reported on 2024)      vitamin E 100 UNIT capsule Take 180 Units by mouth Daily.       No current facility-administered medications for this visit.        Allergies:      Allergies   Allergen Reactions    Egg Yolk Nausea Only        Past Surgical History:     Past Surgical History:   Procedure Laterality Date    CHOLECYSTECTOMY      GASTRIC BYPASS      HYSTERECTOMY      TONSILLECTOMY         Social History:     Social History     Socioeconomic History    Marital status:    Tobacco Use    Smoking status: Former     Current packs/day: 0.00     Average packs/day: 1.5 packs/day for 25.4 years (38.1 ttl pk-yrs)     Types: Cigarettes     Start date: 1981     Quit date: 2006     Years since quittin.1    Smokeless tobacco: Never   Vaping Use    Vaping status: Never Used   Substance and Sexual Activity    Alcohol use: No    Drug use: No    Sexual activity: Not Currently     Partners: Male     Birth control/protection: Abstinence, Post-menopausal       Family History:     Family History   Problem Relation Age of Onset    Heart disease  Father     Hypertension Father     Stroke Father     Diabetes Father     Hypertension Mother     Hyperlipidemia Mother        Review of Systems:     Review of Systems   Constitutional: Negative.  Negative for activity change, appetite change, chills, diaphoresis, fatigue and unexpected weight change.   HENT:  Negative for congestion, dental problem, drooling, ear discharge, ear pain, facial swelling, hearing loss, mouth sores, nosebleeds, postnasal drip, rhinorrhea, sinus pressure, sneezing, sore throat, tinnitus, trouble swallowing and voice change.    Eyes: Negative.  Negative for photophobia, pain, discharge, redness, itching and visual disturbance.   Respiratory: Negative.  Negative for apnea, cough, choking, chest tightness, shortness of breath, wheezing and stridor.    Cardiovascular: Negative.  Negative for chest pain, palpitations and leg swelling.   Gastrointestinal: Negative.  Negative for abdominal distention, abdominal pain, anal bleeding, blood in stool, constipation, diarrhea, nausea, rectal pain and vomiting.   Endocrine: Negative.  Negative for cold intolerance, heat intolerance, polydipsia, polyphagia and polyuria.   Musculoskeletal: Negative.  Negative for arthralgias, back pain, gait problem, joint swelling, myalgias, neck pain and neck stiffness.   Skin: Negative.  Negative for color change, pallor, rash and wound.   Allergic/Immunologic: Negative.  Negative for environmental allergies, food allergies and immunocompromised state.   Neurological: Negative.  Negative for dizziness, tremors, seizures, syncope, facial asymmetry, speech difficulty, weakness, light-headedness, numbness and headaches.   Hematological: Negative.  Negative for adenopathy. Does not bruise/bleed easily.   Psychiatric/Behavioral:  Negative for agitation, behavioral problems, confusion, decreased concentration, dysphoric mood, hallucinations, self-injury, sleep disturbance and suicidal ideas. The patient is not  nervous/anxious and is not hyperactive.    All other systems reviewed and are negative.      Physical Exam:     Physical Exam  Constitutional:       Appearance: She is well-developed.   HENT:      Head: Normocephalic and atraumatic.      Right Ear: External ear normal.      Left Ear: External ear normal.   Eyes:      Conjunctiva/sclera: Conjunctivae normal.      Pupils: Pupils are equal, round, and reactive to light.   Cardiovascular:      Rate and Rhythm: Normal rate and regular rhythm.      Heart sounds: Normal heart sounds.   Pulmonary:      Effort: Pulmonary effort is normal.      Breath sounds: Normal breath sounds.   Abdominal:      General: Bowel sounds are normal. There is no distension.      Palpations: Abdomen is soft. There is no mass.      Tenderness: There is no abdominal tenderness. There is no guarding or rebound.   Genitourinary:     Vagina: No vaginal discharge.   Musculoskeletal:         General: Normal range of motion.   Skin:     General: Skin is warm and dry.   Neurological:      Mental Status: She is alert.      Deep Tendon Reflexes: Reflexes are normal and symmetric.   Psychiatric:         Behavior: Behavior normal.         Thought Content: Thought content normal.         Judgment: Judgment normal.         I have reviewed the following portions of the patient's history: Allergies, current medications, past family history, past medical history, past social history, past surgical history, problem list, and ROS and confirm it is accurate.    Recent Image (CT and/or KUB):      CT Abdomen and Pelvis: No results found for this or any previous visit.       CT Stone Protocol: Results for orders placed during the hospital encounter of 10/23/18    CT Abdomen Pelvis Stone Protocol    Narrative  CT ABDOMEN PELVIS STONE PROTOCOL-    REASON FOR EXAM: renal lesion; N28.1-Cyst of kidney, acquired    No previous CTs are available for comparison.    Scans were obtained from the lung bases and extended through  the pelvis.  There were no pulmonary parenchymal infiltrates or pleural effusions in  the lung bases. The liver and spleen were normal in size and shape. The  gallbladder is surgically absent. There are postoperative changes seen  in the stomach that would be consistent with gastric partitioning  surgery. The pancreas was unremarkable. No masses were noted in the  adrenal glands. The kidneys showed no evidence of obstruction. The left  kidney is small in size with diffuse thinning of the cortex. There is no  ascites or free air. The aorta is normal in caliber. The bowel shows no  evidence of obstruction or inflammation. The appendix was unremarkable.  In the pelvis the uterus was absent. No pelvic masses or fluid  collections demonstrated.    Impression  Small left kidney with diffuse thinning of the cortical  tissue. There is no evidence of obstruction to either kidney. A focal  renal lesion was not identified.  1008.58 mGy.cm  The radiation dose reduction device was utilized for each scan per the  ALARA (as low as reasonably achievable) protocol.    This report was finalized on 10/23/2018 1:47 PM by Dr. Kane Cochran II, MD.       KUB: No results found for this or any previous visit.       Labs (past 3 months):      No visits with results within 3 Month(s) from this visit.   Latest known visit with results is:   Office Visit on 03/23/2021   Component Date Value Ref Range Status    Color 03/23/2021 Yellow  Yellow, Straw, Dark Yellow, Natty Final    Clarity, UA 03/23/2021 Cloudy (A)  Clear Final    Specific Gravity  03/23/2021 1.020  1.005 - 1.030 Final    pH, Urine 03/23/2021 6.0  5.0 - 8.0 Final    Leukocytes 03/23/2021 Moderate (2+) (A)  Negative Final    Nitrite, UA 03/23/2021 Negative  Negative Final    Protein, POC 03/23/2021 Negative  Negative mg/dL Final    Glucose, UA 03/23/2021 Negative  Negative, 1000 mg/dL (3+) mg/dL Final    Ketones, UA 03/23/2021 Negative  Negative Final    Urobilinogen, UA  03/23/2021 Normal  Normal Final    Bilirubin 03/23/2021 1 mg/dL (A)  Negative Final    Blood, UA 03/23/2021 Negative  Negative Final    Urine Culture 03/23/2021 >100,000 CFU/mL Mixed Debbie Isolated   Final        Procedure:       Assessment/Plan:   Renal cyst-I discussed the Bosniak classification of renal cysts.  I discussed the strict criteria involved with making a decision regarding intervention.  We discussed the fact that this is likely a Bosniak type I cyst which has sharp distinct borders and a thin wall and no internal echoes versus a Bosniak 2 with a thicker wall and faint striations.  We discussed the risk of malignancy in these situations is essentially zero and requires no further intervention.  However, we discussed the fact that a Bosniak 3 has about a 28% chance of malignancy and finally a Bosniak 4 probably is representative of a renal cell carcinoma variant.  We discussed the fact that these are generally asymptomatic and do not require intervention and that the appropriate surgical management is a radiographic cyst puncture when causing pain or problems, particularly on the left with an early satiety.          This document has been electronically signed by SALOMON CARBALLO MD July 9, 2024 09:47 EDT    Dictated Utilizing Dragon Dictation: Part of this note may be an electronic transcription/translation of spoken language to printed text using the Dragon Dictation System.